# Patient Record
Sex: FEMALE | Race: WHITE | Employment: PART TIME
[De-identification: names, ages, dates, MRNs, and addresses within clinical notes are randomized per-mention and may not be internally consistent; named-entity substitution may affect disease eponyms.]

---

## 2022-05-04 DIAGNOSIS — E78.2 MIXED HYPERLIPIDEMIA: ICD-10-CM

## 2022-05-04 DIAGNOSIS — E11.9 TYPE 2 DIABETES MELLITUS WITHOUT COMPLICATION, WITHOUT LONG-TERM CURRENT USE OF INSULIN (HCC): Primary | ICD-10-CM

## 2022-05-05 PROBLEM — E78.2 MIXED HYPERLIPIDEMIA: Status: ACTIVE | Noted: 2022-05-05

## 2022-05-05 PROBLEM — E11.9 CONTROLLED TYPE 2 DIABETES MELLITUS WITHOUT COMPLICATION, WITHOUT LONG-TERM CURRENT USE OF INSULIN (HCC): Status: ACTIVE | Noted: 2022-05-05

## 2022-07-25 ENCOUNTER — TELEPHONE (OUTPATIENT)
Dept: PRIMARY CARE CLINIC | Facility: CLINIC | Age: 47
End: 2022-07-25

## 2022-08-02 NOTE — TELEPHONE ENCOUNTER
Refill request for Montelukast 10 mg, please send RX to [facility]. Thank you!

## 2022-08-03 RX ORDER — MONTELUKAST SODIUM 10 MG/1
10 TABLET ORAL DAILY
Qty: 90 TABLET | Refills: 0 | Status: SHIPPED | OUTPATIENT
Start: 2022-08-03 | End: 2022-08-09 | Stop reason: SDUPTHER

## 2022-08-09 ENCOUNTER — TELEMEDICINE (OUTPATIENT)
Dept: PRIMARY CARE CLINIC | Facility: CLINIC | Age: 47
End: 2022-08-09
Payer: COMMERCIAL

## 2022-08-09 DIAGNOSIS — E78.2 MIXED HYPERLIPIDEMIA: ICD-10-CM

## 2022-08-09 DIAGNOSIS — J30.9 CHRONIC ALLERGIC RHINITIS: ICD-10-CM

## 2022-08-09 DIAGNOSIS — E11.9 CONTROLLED TYPE 2 DIABETES MELLITUS WITHOUT COMPLICATION, WITHOUT LONG-TERM CURRENT USE OF INSULIN (HCC): Primary | ICD-10-CM

## 2022-08-09 PROCEDURE — 99213 OFFICE O/P EST LOW 20 MIN: CPT | Performed by: FAMILY MEDICINE

## 2022-08-09 PROCEDURE — 3044F HG A1C LEVEL LT 7.0%: CPT | Performed by: FAMILY MEDICINE

## 2022-08-09 RX ORDER — METFORMIN HYDROCHLORIDE 500 MG/1
1000 TABLET, EXTENDED RELEASE ORAL 2 TIMES DAILY
Qty: 360 TABLET | Refills: 1 | Status: SHIPPED | OUTPATIENT
Start: 2022-08-09 | End: 2022-11-07

## 2022-08-09 RX ORDER — EMPAGLIFLOZIN 10 MG/1
10 TABLET, FILM COATED ORAL DAILY
COMMUNITY
Start: 2022-05-04 | End: 2022-08-09 | Stop reason: SDUPTHER

## 2022-08-09 RX ORDER — FENOFIBRATE 160 MG/1
160 TABLET ORAL DAILY
Qty: 90 TABLET | Refills: 1 | Status: SHIPPED | OUTPATIENT
Start: 2022-08-09 | End: 2022-11-07

## 2022-08-09 RX ORDER — FEXOFENADINE HCL 180 MG/1
180 TABLET ORAL DAILY PRN
COMMUNITY

## 2022-08-09 RX ORDER — ATORVASTATIN CALCIUM 10 MG/1
10 TABLET, FILM COATED ORAL DAILY
COMMUNITY
Start: 2022-05-04 | End: 2022-08-09 | Stop reason: SDUPTHER

## 2022-08-09 RX ORDER — ATORVASTATIN CALCIUM 10 MG/1
10 TABLET, FILM COATED ORAL DAILY
Qty: 90 TABLET | Refills: 1 | Status: SHIPPED | OUTPATIENT
Start: 2022-08-09 | End: 2022-11-07

## 2022-08-09 RX ORDER — LOSARTAN POTASSIUM 25 MG/1
12.5 TABLET ORAL DAILY
COMMUNITY
End: 2022-08-09 | Stop reason: SDUPTHER

## 2022-08-09 RX ORDER — EMPAGLIFLOZIN 10 MG/1
10 TABLET, FILM COATED ORAL DAILY
Qty: 90 TABLET | Refills: 1 | Status: SHIPPED | OUTPATIENT
Start: 2022-08-09 | End: 2022-11-07

## 2022-08-09 RX ORDER — METFORMIN HYDROCHLORIDE 500 MG/1
500 TABLET, EXTENDED RELEASE ORAL 2 TIMES DAILY
COMMUNITY
Start: 2022-05-04 | End: 2022-08-09 | Stop reason: SDUPTHER

## 2022-08-09 RX ORDER — MONTELUKAST SODIUM 10 MG/1
10 TABLET ORAL DAILY
Qty: 90 TABLET | Refills: 1 | Status: SHIPPED | OUTPATIENT
Start: 2022-08-09 | End: 2022-11-07

## 2022-08-09 RX ORDER — LOSARTAN POTASSIUM 25 MG/1
12.5 TABLET ORAL DAILY
Qty: 90 TABLET | Refills: 1 | Status: SHIPPED | OUTPATIENT
Start: 2022-08-09 | End: 2022-11-07

## 2022-08-09 RX ORDER — FENOFIBRATE 160 MG/1
160 TABLET ORAL DAILY
COMMUNITY
Start: 2022-05-04 | End: 2022-08-09 | Stop reason: SDUPTHER

## 2022-08-09 ASSESSMENT — ENCOUNTER SYMPTOMS
EYE REDNESS: 0
EYE DISCHARGE: 0
SORE THROAT: 0
NAUSEA: 0
ABDOMINAL PAIN: 0
EYE PAIN: 0
SINUS PAIN: 0
SHORTNESS OF BREATH: 0
RHINORRHEA: 0
SINUS PRESSURE: 0
DIARRHEA: 0
BACK PAIN: 0
BLOOD IN STOOL: 0
CONSTIPATION: 0
EYE ITCHING: 0
CHEST TIGHTNESS: 0
COUGH: 0
VOMITING: 0
WHEEZING: 0

## 2022-08-09 ASSESSMENT — PATIENT HEALTH QUESTIONNAIRE - PHQ9
SUM OF ALL RESPONSES TO PHQ QUESTIONS 1-9: 0
SUM OF ALL RESPONSES TO PHQ9 QUESTIONS 1 & 2: 0
1. LITTLE INTEREST OR PLEASURE IN DOING THINGS: 0
SUM OF ALL RESPONSES TO PHQ QUESTIONS 1-9: 0
2. FEELING DOWN, DEPRESSED OR HOPELESS: 0
SUM OF ALL RESPONSES TO PHQ QUESTIONS 1-9: 0
SUM OF ALL RESPONSES TO PHQ QUESTIONS 1-9: 0

## 2022-08-09 NOTE — ASSESSMENT & PLAN NOTE
Labs ordered, advised to get it done. Short acting metformin causing diarrhea without abdominal pain for the past 4 years, switched to extended release 500 mg 2 tablets twice daily metformin to decrease any side effects and to also avoid any   diarrhea. No history of high blood pressure, takes losartan for renal protection. Recommended to cut down on carbohydrates and increase proteins. Cut down on sugars, sweets, bread, rice, potatoes, cereals, pasta. Eat more of vegetables and greens and salads. Recommended to follow diabetic diet, 1800 dandre ADA diet. Stop or cut down on alcohol. Explained   to the patient in detail the importance of eating about the same time, balance diet, portion sizes. Also explained the symptoms and signs of low   and high sugar and complications of diabetes including but not limited to kidney, eye, nerve damage, and heart attack/strokes. Stable on current regimen, advised to continue it. Pt agreed.

## 2022-08-09 NOTE — PROGRESS NOTES
Hamida Dolan (:  1975) is a Established patient, here for evaluation of the following:    Assessment & Plan   Below is the assessment and plan developed based on review of pertinent history, physical exam, labs, studies, and medications. 1. Controlled type 2 diabetes mellitus without complication, without long-term current use of insulin [facility])  Assessment & Plan:  Labs ordered, advised to get it done. Short acting metformin causing diarrhea without abdominal pain for the past 4 years, switched to extended release 500 mg 2 tablets twice daily metformin to decrease any side effects and to also avoid any   diarrhea. No history of high blood pressure, takes losartan for renal protection. Recommended to cut down on carbohydrates and increase proteins. Cut down on sugars, sweets, bread, rice, potatoes, cereals, pasta. Eat more of vegetables and greens and salads. Recommended to follow diabetic diet, 1800 dandre ADA diet. Stop or cut down on alcohol. Explained   to the patient in detail the importance of eating about the same time, balance diet, portion sizes. Also explained the symptoms and signs of low   and high sugar and complications of diabetes including but not limited to kidney, eye, nerve damage, and heart attack/strokes. Stable on current regimen, advised to continue it. Pt agreed. Orders:  -     Basic Metabolic Panel; Future  -     Hemoglobin A1C; Future  -     JARDIANCE 10 MG tablet; Take 1 tablet by mouth in the morning., Disp-90 tablet, R-1, DAWNormal  -     losartan (COZAAR) 25 MG tablet; Take 0.5 tablets by mouth in the morning., Disp-90 tablet, R-1Normal  -     metFORMIN (GLUCOPHAGE-XR) 500 MG extended release tablet; Take 2 tablets by mouth in the morning and at bedtime, Disp-360 tablet, R-1Normal  -     [facility]  2. Mixed hyperlipidemia  -     atorvastatin (LIPITOR) 10 MG tablet;  Take 1 tablet by mouth in the morning., Disp-90 tablet, R-1Normal  -     fenofibrate (TRIGLIDE) 160 MG tablet; Take 1 tablet by mouth in the morning., Disp-90 tablet, R-1Normal  3. Chronic allergic rhinitis  -     montelukast (SINGULAIR) 10 MG tablet; Take 1 tablet by mouth in the morning., Disp-90 tablet, R-1Normal      Return in about 6 months (around 2/9/2023). Subjective   Diabetes  The patient is a 40 y.o. female who is seen for follow up of diabetes. Patient complains of associated symptoms: none. Patient denies: polyuria, polydipsia, visual disturbances, foot ulcerations, chest pain, dyspnea on exertion, swelling, muscle aches. Onset/Duration of symptoms was several years ago,  Quality of DM control: excellent  Timing: progressive   DM symptoms Severity are mild  Modifying Factors: Treatment to date: continued metformin: effective, continued statin: effective, continued jardiance: effective. Context: Concurrent health issues: no polyuria or polydipsia, no chest pain, dyspnea or TIA's, no numbness, tingling or pain in extremities, no unusual visual symptoms, no hypoglycemia, blurry vision. taking medications as instructed, side effects noted by patient include no medication side effects noted and diarrhea from metformin, no TIA's, no chest pain on exertion, no dyspnea on exertion, no swelling of ankles, no orthostatic dizziness or lightheadedness, no orthopnea or paroxysmal nocturnal dyspnea, no palpitations, no change to vision, no change to feet. oral agents (dual therapy): metformin and jardiance  Last eye exam done Oct 2021. No trouble noted with feet. Home sugars: patient does not check sugars. Hyperlipidemia  The patient is following a low fat diet and is exercising sparodically. She is tolerating current treatment well and  is compliant. Patient reports associated symptoms of none. Denies all other ROS. No other problems or concerns    Review of Systems   Constitutional:  Negative for activity change, appetite change, chills, fatigue, fever and unexpected weight change. HENT:  Negative for congestion, ear discharge, ear pain, postnasal drip, rhinorrhea, sinus pressure, sinus pain, sneezing and sore throat. Eyes:  Negative for pain, discharge, redness, itching and visual disturbance. Respiratory:  Negative for cough, chest tightness, shortness of breath and wheezing. Cardiovascular:  Negative for chest pain, palpitations and leg swelling. Gastrointestinal:  Negative for abdominal pain, blood in stool, constipation, diarrhea, nausea and vomiting. Musculoskeletal:  Negative for arthralgias, back pain, gait problem, myalgias, neck pain and neck stiffness. Skin:  Negative for rash and wound. Neurological:  Negative for dizziness, tremors, syncope, weakness, light-headedness, numbness and headaches. Psychiatric/Behavioral:  Negative for agitation, behavioral problems, confusion, self-injury, sleep disturbance and suicidal ideas. The patient is not nervous/anxious. Neg Depression   All other systems reviewed and are negative.        Objective   Patient-Reported Vitals  Patient-Reported Systolic (Top): 626 mmHg  Patient-Reported Diastolic (Bottom): 88 mmHg  Patient-Reported Pulse: 84  Patient-Reported Weight: 250 lbs     Physical Exam  [INSTRUCTIONS:  \"[x]\" Indicates a positive item  \"[]\" Indicates a negative item  -- DELETE ALL ITEMS NOT EXAMINED]    Constitutional: [x] Appears well-developed and well-nourished [x] No apparent distress      [] Abnormal -     Mental status: [x] Alert and awake  [x] Oriented to person/place/time [x] Able to follow commands    [] Abnormal -     Eyes:   EOM    [x]  Normal    [] Abnormal -   Sclera  [x]  Normal    [] Abnormal -          Discharge [x]  None visible   [] Abnormal -     HENT: [x] Normocephalic, atraumatic  [] Abnormal -   [x] Mouth/Throat: Mucous membranes are moist    External Ears [x] Normal  [] Abnormal -    Neck: [x] No visualized mass [] Abnormal -     Pulmonary/Chest: [x] Respiratory effort normal   [x] No visualized signs of difficulty breathing or respiratory distress        [] Abnormal -      Musculoskeletal:   [x] Normal gait with no signs of ataxia         [x] Normal range of motion of neck        [] Abnormal -     Neurological:        [x] No Facial Asymmetry (Cranial nerve 7 motor function) (limited exam due to video visit)          [x] No gaze palsy        [] Abnormal -          Skin:        [x] No significant exanthematous lesions or discoloration noted on facial skin         [] Abnormal -            Psychiatric:       [x] Normal Affect [] Abnormal -        [x] No Hallucinations    Other pertinent observable physical exam findings:-         On this date 8/9/2022 I have spent 15 minutes reviewing previous notes, test results and face to face (virtual) with the patient discussing the diagnosis and importance of compliance with the treatment plan as well as documenting on the day of the visit. Mikel Cover, was evaluated through a synchronous (real-time) audio-video encounter. The patient (or guardian if applicable) is aware that this is a billable service, which includes applicable co-pays. This Virtual Visit was conducted with patient's (and/or legal guardian's) consent. The visit was conducted pursuant to the emergency declaration under the [address] [facility], [address] [facility] authority and the "[employer] and [employer] General Act. Patient identification was verified, and a caregiver was present when appropriate. The patient was located at Home: [address] [facility]. Provider was located at [facility] ([address] [place] [address]): [phone]  [address] [facility],  [address] [facility]

## 2022-09-16 LAB
CHOLEST SERPL-MCNC: 152 MG/DL
GLUCOSE SERPL-MCNC: 122 MG/DL (ref 65–100)
HDLC SERPL-MCNC: 37 MG/DL (ref 40–60)
LDLC SERPL CALC-MCNC: 35.6 MG/DL
TRIGL SERPL-MCNC: 397 MG/DL (ref 35–150)

## 2022-11-03 DIAGNOSIS — E11.9 CONTROLLED TYPE 2 DIABETES MELLITUS WITHOUT COMPLICATION, WITHOUT LONG-TERM CURRENT USE OF INSULIN (HCC): ICD-10-CM

## 2022-11-03 DIAGNOSIS — E78.2 MIXED HYPERLIPIDEMIA: ICD-10-CM

## 2022-11-08 RX ORDER — METFORMIN HYDROCHLORIDE 500 MG/1
1000 TABLET, EXTENDED RELEASE ORAL 2 TIMES DAILY
Qty: 360 TABLET | Refills: 0 | OUTPATIENT
Start: 2022-11-08 | End: 2023-02-06

## 2022-11-08 RX ORDER — ATORVASTATIN CALCIUM 10 MG/1
TABLET, FILM COATED ORAL
Qty: 90 TABLET | Refills: 0 | OUTPATIENT
Start: 2022-11-08

## 2022-11-08 RX ORDER — FENOFIBRATE 160 MG/1
TABLET ORAL
Qty: 90 TABLET | Refills: 0 | OUTPATIENT
Start: 2022-11-08

## 2022-11-18 DIAGNOSIS — E11.9 CONTROLLED TYPE 2 DIABETES MELLITUS WITHOUT COMPLICATION, WITHOUT LONG-TERM CURRENT USE OF INSULIN (HCC): ICD-10-CM

## 2022-11-22 RX ORDER — EMPAGLIFLOZIN 10 MG/1
TABLET, FILM COATED ORAL
Qty: 90 TABLET | Refills: 0 | OUTPATIENT
Start: 2022-11-22

## 2023-01-06 DIAGNOSIS — E11.9 CONTROLLED TYPE 2 DIABETES MELLITUS WITHOUT COMPLICATION, WITHOUT LONG-TERM CURRENT USE OF INSULIN (HCC): ICD-10-CM

## 2023-01-06 DIAGNOSIS — E78.2 MIXED HYPERLIPIDEMIA: ICD-10-CM

## 2023-01-06 DIAGNOSIS — E11.9 TYPE 2 DIABETES MELLITUS WITHOUT COMPLICATION, WITHOUT LONG-TERM CURRENT USE OF INSULIN (HCC): ICD-10-CM

## 2023-01-06 LAB
ALBUMIN SERPL-MCNC: 4.2 G/DL (ref 3.5–5)
ALBUMIN/GLOB SERPL: 1.3 (ref 0.4–1.6)
ALP SERPL-CCNC: 57 U/L (ref 50–136)
ALT SERPL-CCNC: 44 U/L (ref 12–65)
ANION GAP SERPL CALC-SCNC: 10 MMOL/L (ref 2–11)
AST SERPL-CCNC: 37 U/L (ref 15–37)
BASOPHILS # BLD: 0.1 K/UL (ref 0–0.2)
BASOPHILS NFR BLD: 1 % (ref 0–2)
BILIRUB DIRECT SERPL-MCNC: <0.1 MG/DL
BILIRUB SERPL-MCNC: 0.4 MG/DL (ref 0.2–1.1)
BUN SERPL-MCNC: 19 MG/DL (ref 6–23)
CALCIUM SERPL-MCNC: 9.9 MG/DL (ref 8.3–10.4)
CHLORIDE SERPL-SCNC: 104 MMOL/L (ref 101–110)
CHOLEST SERPL-MCNC: 134 MG/DL
CO2 SERPL-SCNC: 25 MMOL/L (ref 21–32)
CREAT SERPL-MCNC: 0.8 MG/DL (ref 0.6–1)
DIFFERENTIAL METHOD BLD: ABNORMAL
EOSINOPHIL # BLD: 0.2 K/UL (ref 0–0.8)
EOSINOPHIL NFR BLD: 2 % (ref 0.5–7.8)
ERYTHROCYTE [DISTWIDTH] IN BLOOD BY AUTOMATED COUNT: 13.5 % (ref 11.9–14.6)
EST. AVERAGE GLUCOSE BLD GHB EST-MCNC: 123 MG/DL
GLOBULIN SER CALC-MCNC: 3.2 G/DL (ref 2.8–4.5)
GLUCOSE SERPL-MCNC: 126 MG/DL (ref 65–100)
HBA1C MFR BLD: 5.9 % (ref 4.8–5.6)
HCT VFR BLD AUTO: 53.9 % (ref 35.8–46.3)
HDLC SERPL-MCNC: 36 MG/DL (ref 40–60)
HDLC SERPL: 3.7
HGB BLD-MCNC: 17.4 G/DL (ref 11.7–15.4)
IMM GRANULOCYTES # BLD AUTO: 0.1 K/UL (ref 0–0.5)
IMM GRANULOCYTES NFR BLD AUTO: 1 % (ref 0–5)
LDLC SERPL CALC-MCNC: ABNORMAL MG/DL
LYMPHOCYTES # BLD: 2.4 K/UL (ref 0.5–4.6)
LYMPHOCYTES NFR BLD: 34 % (ref 13–44)
MCH RBC QN AUTO: 29.3 PG (ref 26.1–32.9)
MCHC RBC AUTO-ENTMCNC: 32.3 G/DL (ref 31.4–35)
MCV RBC AUTO: 90.7 FL (ref 82–102)
MONOCYTES # BLD: 0.5 K/UL (ref 0.1–1.3)
MONOCYTES NFR BLD: 7 % (ref 4–12)
NEUTS SEG # BLD: 3.9 K/UL (ref 1.7–8.2)
NEUTS SEG NFR BLD: 55 % (ref 43–78)
NRBC # BLD: 0 K/UL (ref 0–0.2)
PLATELET # BLD AUTO: 281 K/UL (ref 150–450)
PMV BLD AUTO: 10.5 FL (ref 9.4–12.3)
POTASSIUM SERPL-SCNC: 4.9 MMOL/L (ref 3.5–5.1)
PROT SERPL-MCNC: 7.4 G/DL (ref 6.3–8.2)
RBC # BLD AUTO: 5.94 M/UL (ref 4.05–5.2)
SODIUM SERPL-SCNC: 139 MMOL/L (ref 133–143)
TRIGL SERPL-MCNC: 489 MG/DL (ref 35–150)
VLDLC SERPL CALC-MCNC: 97.8 MG/DL (ref 6–23)
WBC # BLD AUTO: 7.1 K/UL (ref 4.3–11.1)

## 2023-01-07 LAB — LDLC SERPL DIRECT ASSAY-MCNC: 63 MG/DL

## 2023-01-10 ENCOUNTER — TELEMEDICINE (OUTPATIENT)
Dept: PRIMARY CARE CLINIC | Facility: CLINIC | Age: 48
End: 2023-01-10
Payer: COMMERCIAL

## 2023-01-10 DIAGNOSIS — G43.709 CHRONIC MIGRAINE W/O AURA W/O STATUS MIGRAINOSUS, NOT INTRACTABLE: ICD-10-CM

## 2023-01-10 DIAGNOSIS — E78.2 MIXED HYPERLIPIDEMIA: ICD-10-CM

## 2023-01-10 DIAGNOSIS — J30.9 CHRONIC ALLERGIC RHINITIS: ICD-10-CM

## 2023-01-10 DIAGNOSIS — E11.9 CONTROLLED TYPE 2 DIABETES MELLITUS WITHOUT COMPLICATION, WITHOUT LONG-TERM CURRENT USE OF INSULIN (HCC): Primary | ICD-10-CM

## 2023-01-10 PROCEDURE — 99214 OFFICE O/P EST MOD 30 MIN: CPT | Performed by: FAMILY MEDICINE

## 2023-01-10 PROCEDURE — 3044F HG A1C LEVEL LT 7.0%: CPT | Performed by: FAMILY MEDICINE

## 2023-01-10 RX ORDER — SUMATRIPTAN 50 MG/1
50 TABLET, FILM COATED ORAL
Qty: 9 TABLET | Refills: 3 | Status: SHIPPED | OUTPATIENT
Start: 2023-01-10 | End: 2023-01-10

## 2023-01-10 RX ORDER — ATORVASTATIN CALCIUM 10 MG/1
10 TABLET, FILM COATED ORAL DAILY
Qty: 90 TABLET | Refills: 1 | Status: SHIPPED | OUTPATIENT
Start: 2023-01-10 | End: 2023-04-10

## 2023-01-10 RX ORDER — LOSARTAN POTASSIUM 25 MG/1
12.5 TABLET ORAL DAILY
Qty: 90 TABLET | Refills: 1 | Status: SHIPPED | OUTPATIENT
Start: 2023-01-10 | End: 2023-04-10

## 2023-01-10 RX ORDER — METFORMIN HYDROCHLORIDE 500 MG/1
1000 TABLET, EXTENDED RELEASE ORAL 2 TIMES DAILY
Qty: 360 TABLET | Refills: 1 | Status: SHIPPED | OUTPATIENT
Start: 2023-01-10 | End: 2023-04-10

## 2023-01-10 RX ORDER — FENOFIBRATE 160 MG/1
160 TABLET ORAL DAILY
Qty: 90 TABLET | Refills: 1 | Status: SHIPPED | OUTPATIENT
Start: 2023-01-10 | End: 2023-04-10

## 2023-01-10 RX ORDER — EMPAGLIFLOZIN 10 MG/1
10 TABLET, FILM COATED ORAL DAILY
Qty: 90 TABLET | Refills: 1 | Status: SHIPPED | OUTPATIENT
Start: 2023-01-10 | End: 2023-04-10

## 2023-01-10 RX ORDER — ONDANSETRON 4 MG/1
4 TABLET, ORALLY DISINTEGRATING ORAL EVERY 12 HOURS PRN
Qty: 21 TABLET | Refills: 1 | Status: SHIPPED | OUTPATIENT
Start: 2023-01-10

## 2023-01-10 RX ORDER — MONTELUKAST SODIUM 10 MG/1
10 TABLET ORAL DAILY
Qty: 90 TABLET | Refills: 1 | Status: SHIPPED | OUTPATIENT
Start: 2023-01-10 | End: 2023-04-10

## 2023-01-10 ASSESSMENT — PATIENT HEALTH QUESTIONNAIRE - PHQ9
SUM OF ALL RESPONSES TO PHQ QUESTIONS 1-9: 0
2. FEELING DOWN, DEPRESSED OR HOPELESS: 0
SUM OF ALL RESPONSES TO PHQ QUESTIONS 1-9: 0
1. LITTLE INTEREST OR PLEASURE IN DOING THINGS: 0
SUM OF ALL RESPONSES TO PHQ9 QUESTIONS 1 & 2: 0

## 2023-01-10 NOTE — PROGRESS NOTES
Hamida Dolan (:  1975) is a Established patient, here for evaluation of the following:    Assessment & Plan   Below is the assessment and plan developed based on review of pertinent history, physical exam, labs, studies, and medications. 1. Controlled type 2 diabetes mellitus without complication, without long-term current use of insulin ([facility] [address]  Assessment & Plan:  Labs reviewed. losartan for renal protection. Advised to take daily baby asa. Recommended to cut down on carbohydrates and increase proteins. Cut down on sugars, sweets, bread, rice, potatoes, cereals, pasta. Eat more of vegetables and greens and salads. Recommended to follow diabetic diet, 1800 dandre ADA diet. Stop or cut down on alcohol. Explained to the patient in detail the importance of eating about the same time, balance diet, portion sizes. Also explained the symptoms and signs of low and high sugar and complications of diabetes including but not limited to kidney, eye, nerve damage, and heart attack/strokes. Stable on current regimen, advised to continue it. Pt agreed. Orders:  -     metFORMIN (GLUCOPHAGE-XR) 500 MG extended release tablet; Take 2 tablets by mouth in the morning and at bedtime, Disp-360 tablet, R-1Normal  -     losartan (COZAAR) 25 MG tablet; Take 0.5 tablets by mouth daily, Disp-90 tablet, R-1Normal  -     JARDIANCE 10 MG tablet; Take 1 tablet by mouth daily, Disp-90 tablet, R-1, DAWNormal  2. Mixed hyperlipidemia  Assessment & Plan:  Explain the need to avoid fatty foods, fried foods, red meats, and sweets. Increase intake of vegetables, lean meats, and reduce intake of carbs. Advise to do minimum of 20-30 minutes of daily exercise. Patient not doing any diet control or exercise, educated on the importance. On statin + fenofibrate. Orders:  -     fenofibrate (TRIGLIDE) 160 MG tablet; Take 1 tablet by mouth daily, Disp-90 tablet, R-1Normal  -     atorvastatin (LIPITOR) 10 MG tablet;  Take 1 tablet by mouth daily, Disp-90 tablet, R-1Normal  3. Chronic allergic rhinitis  -     montelukast (SINGULAIR) 10 MG tablet; Take 1 tablet by mouth daily, Disp-90 tablet, R-1Normal  4. Chronic migraine w/o aura w/o status migrainosus, not intractable  -     SUMAtriptan (IMITREX) 50 MG tablet; Take 1 tablet by mouth once as needed for Migraine (repeat after 2 hours as needed), Disp-9 tablet, R-3Normal  -     ondansetron (ZOFRAN-ODT) 4 MG disintegrating tablet; Place 1 tablet under the tongue every 12 hours as needed for Nausea or Vomiting, Disp-21 tablet, R-1Normal    Use med as directed, SE informed. Advised on good, consistent sleep regimen, stress management, regular exercise, adequated water intake (half of body wt in oz). Advised on whole foods, eat regular meals, avoid processed foods, artificial foods/sweeteners. Avoid substances such as caffeine, alcohol, cigarettes. Advised to log (do diary) of headache triggers (foods, events, scents, sensitivity to things/events). Advised to monitor and report to ED/ER for any thunder clap headache or worsening headaches blanca changes in cognition, vision. Stable on chronic conditions, refills given today. Labs reviewed today. All questions and concerns answered. Patient voiced understanding and agrees with POC. Return in about 6 months (around 7/10/2023) for Physical.       Subjective   Diabetes  The patient is a 40 y.o. female who is seen for follow up of diabetes and hyperlipidemia. Patient complains of associated symptoms: none. Patient denies: polyuria, polydipsia, visual disturbances, foot ulcerations, chest pain, dyspnea on exertion, swelling, muscle aches. Onset/Duration of symptoms was several years ago,  Quality of DM control: excellent  Timing: progressive   DM symptoms Severity are mild  Modifying Factors: Treatment to date: continued metformin: effective, continued statin: effective, continued jardiance: effective.   Context: Concurrent health issues: no polyuria or polydipsia, no chest pain, dyspnea or TIA's, no numbness, tingling or pain in extremities, no unusual visual symptoms, no hypoglycemia, blurry vision. taking medications as instructed, side effects noted by patient include no medication side effects noted and diarrhea from metformin, no TIA's, no chest pain on exertion, no dyspnea on exertion, no swelling of ankles, no orthostatic dizziness or lightheadedness, no orthopnea or paroxysmal nocturnal dyspnea, no palpitations, no change to vision, no change to feet. oral agents (dual therapy): metformin and jardiance  Last eye exam done Oct 2021. No trouble noted with feet. Home sugars: patient does not check sugars. Hyperlipidemia  No diet control or exercise regimen. She is tolerating current treatment well and  is compliant. Patient reports associated symptoms of none. She states that she used to get migraine in the past, and it has started to reoccur. She previously had it prior to birth of her child and now after many years, she has started to have it again. It has occurred 4-6 times over the one year, and is requesting to take as needed migraine medication. She states that she has nausea and vomiting with the migraine headache. Denies all other ROS. No other problems or concerns    Review of Systems   Constitutional:  Negative for appetite change, chills, fatigue, fever and unexpected weight change. HENT:  Negative for congestion, ear discharge, ear pain, postnasal drip, rhinorrhea, sinus pressure, sinus pain, sneezing and sore throat. Eyes:  Negative for pain, redness and visual disturbance. Respiratory:  Negative for cough, chest tightness, shortness of breath and wheezing. Cardiovascular:  Negative for chest pain, palpitations and leg swelling. Gastrointestinal:  Negative for abdominal pain, blood in stool, constipation, diarrhea, nausea and vomiting.    Musculoskeletal:  Negative for arthralgias, back pain, gait problem, neck pain and neck stiffness. Skin:  Negative for rash and wound. Neurological:  Negative for dizziness, tremors, syncope, weakness, numbness and headaches. Psychiatric/Behavioral:  Negative for behavioral problems, confusion, self-injury, sleep disturbance and suicidal ideas. The patient is not nervous/anxious. Denies Depression   All other systems reviewed and are negative.        Objective   Patient-Reported Vitals  No data recorded     Physical Exam  [INSTRUCTIONS:  \"[x]\" Indicates a positive item  \"[]\" Indicates a negative item  -- DELETE ALL ITEMS NOT EXAMINED]    Constitutional: [x] Appears well-developed and well-nourished [x] No apparent distress      [x] Abnormal - obese    Mental status: [x] Alert and awake  [x] Oriented to person/place/time [x] Able to follow commands    [] Abnormal -     Eyes:   EOM    [x]  Normal    [] Abnormal -   Sclera  [x]  Normal    [] Abnormal -          Discharge [x]  None visible   [] Abnormal -     HENT: [x] Normocephalic, atraumatic  [] Abnormal -   [x] Mouth/Throat: Mucous membranes are moist    External Ears [x] Normal  [] Abnormal -    Neck: [x] No visualized mass [] Abnormal -     Pulmonary/Chest: [x] Respiratory effort normal   [x] No visualized signs of difficulty breathing or respiratory distress        [] Abnormal -      Musculoskeletal:   [x] Normal gait with no signs of ataxia         [x] Normal range of motion of neck        [] Abnormal -     Neurological:        [x] No Facial Asymmetry (Cranial nerve 7 motor function) (limited exam due to video visit)          [x] No gaze palsy        [] Abnormal -          Skin:        [x] No significant exanthematous lesions or discoloration noted on facial skin         [] Abnormal -            Psychiatric:       [x] Normal Affect [] Abnormal -        [x] No Hallucinations    Other pertinent observable physical exam findings:-         On this date 1/10/2023 I have spent 20 minutes reviewing previous notes, test results and face to face (virtual) with the patient discussing the diagnosis and importance of compliance with the treatment plan as well as documenting on the day of the visit. Allyson Tapia, was evaluated through a synchronous (real-time) audio-video encounter. The patient (or guardian if applicable) is aware that this is a billable service, which includes applicable co-pays. This Virtual Visit was conducted with patient's (and/or legal guardian's) consent. The visit was conducted pursuant to the emergency declaration under the [address] [place] [address], [address] [place] [address] authority and the [employer] General Act. Patient identification was verified, and a caregiver was present when appropriate. The patient was located at Home: [address] [facility]. Provider was located at [facility] ([address] [place] [address]): [phone]  [address] [facility],  [address] [facility]

## 2023-01-11 ASSESSMENT — ENCOUNTER SYMPTOMS
SINUS PRESSURE: 0
SHORTNESS OF BREATH: 0
NAUSEA: 0
SORE THROAT: 0
COUGH: 0
BLOOD IN STOOL: 0
CHEST TIGHTNESS: 0
EYE REDNESS: 0
DIARRHEA: 0
CONSTIPATION: 0
EYE PAIN: 0
WHEEZING: 0
SINUS PAIN: 0
BACK PAIN: 0
RHINORRHEA: 0
VOMITING: 0
ABDOMINAL PAIN: 0

## 2023-01-11 NOTE — ASSESSMENT & PLAN NOTE
Explain the need to avoid fatty foods, fried foods, red meats, and sweets. Increase intake of vegetables, lean meats, and reduce intake of carbs. Advise to do minimum of 20-30 minutes of daily exercise. Patient not doing any diet control or exercise, educated on the importance. On statin + fenofibrate. No pertinent family history in first degree relatives

## 2023-01-11 NOTE — ASSESSMENT & PLAN NOTE
Labs reviewed. losartan for renal protection. Advised to take daily baby asa. Recommended to cut down on carbohydrates and increase proteins. Cut down on sugars, sweets, bread, rice, potatoes, cereals, pasta. Eat more of vegetables and greens and salads. Recommended to follow diabetic diet, 1800 dandre ADA diet. Stop or cut down on alcohol. Explained to the patient in detail the importance of eating about the same time, balance diet, portion sizes. Also explained the symptoms and signs of low and high sugar and complications of diabetes including but not limited to kidney, eye, nerve damage, and heart attack/strokes. Stable on current regimen, advised to continue it. Pt agreed.

## 2023-01-31 ENCOUNTER — OFFICE VISIT (OUTPATIENT)
Dept: PRIMARY CARE CLINIC | Facility: CLINIC | Age: 48
End: 2023-01-31
Payer: COMMERCIAL

## 2023-01-31 VITALS
SYSTOLIC BLOOD PRESSURE: 137 MMHG | WEIGHT: 255 LBS | OXYGEN SATURATION: 97 % | DIASTOLIC BLOOD PRESSURE: 88 MMHG | BODY MASS INDEX: 40.98 KG/M2 | HEIGHT: 66 IN | HEART RATE: 93 BPM

## 2023-01-31 DIAGNOSIS — E78.2 MIXED HYPERLIPIDEMIA: ICD-10-CM

## 2023-01-31 DIAGNOSIS — M54.50 ACUTE RIGHT-SIDED LOW BACK PAIN WITHOUT SCIATICA: Primary | ICD-10-CM

## 2023-01-31 DIAGNOSIS — M62.830 BACK MUSCLE SPASM: ICD-10-CM

## 2023-01-31 DIAGNOSIS — Z71.3 DIETARY COUNSELING: ICD-10-CM

## 2023-01-31 DIAGNOSIS — E11.9 CONTROLLED TYPE 2 DIABETES MELLITUS WITHOUT COMPLICATION, WITHOUT LONG-TERM CURRENT USE OF INSULIN (HCC): ICD-10-CM

## 2023-01-31 PROCEDURE — 3044F HG A1C LEVEL LT 7.0%: CPT | Performed by: FAMILY MEDICINE

## 2023-01-31 PROCEDURE — 99214 OFFICE O/P EST MOD 30 MIN: CPT | Performed by: FAMILY MEDICINE

## 2023-01-31 RX ORDER — IBUPROFEN 800 MG/1
800 TABLET ORAL EVERY 8 HOURS PRN
Qty: 30 TABLET | Refills: 0 | Status: SHIPPED | OUTPATIENT
Start: 2023-01-31 | End: 2023-02-10

## 2023-01-31 RX ORDER — ATORVASTATIN CALCIUM 20 MG/1
20 TABLET, FILM COATED ORAL DAILY
Qty: 90 TABLET | Refills: 0 | Status: SHIPPED | OUTPATIENT
Start: 2023-01-31 | End: 2023-05-01

## 2023-01-31 RX ORDER — METHYLPREDNISOLONE 4 MG/1
TABLET ORAL
Qty: 1 KIT | Refills: 0 | Status: SHIPPED | OUTPATIENT
Start: 2023-01-31 | End: 2023-02-06

## 2023-01-31 RX ORDER — CYCLOBENZAPRINE HCL 10 MG
10 TABLET ORAL 2 TIMES DAILY PRN
Qty: 20 TABLET | Refills: 0 | Status: SHIPPED | OUTPATIENT
Start: 2023-01-31 | End: 2023-02-10 | Stop reason: SDUPTHER

## 2023-01-31 SDOH — ECONOMIC STABILITY: FOOD INSECURITY: WITHIN THE PAST 12 MONTHS, THE FOOD YOU BOUGHT JUST DIDN'T LAST AND YOU DIDN'T HAVE MONEY TO GET MORE.: NEVER TRUE

## 2023-01-31 SDOH — ECONOMIC STABILITY: INCOME INSECURITY: IN THE LAST 12 MONTHS, WAS THERE A TIME WHEN YOU WERE NOT ABLE TO PAY THE MORTGAGE OR RENT ON TIME?: NO

## 2023-01-31 SDOH — ECONOMIC STABILITY: TRANSPORTATION INSECURITY
IN THE PAST 12 MONTHS, HAS THE LACK OF TRANSPORTATION KEPT YOU FROM MEDICAL APPOINTMENTS OR FROM GETTING MEDICATIONS?: NO

## 2023-01-31 SDOH — HEALTH STABILITY: PHYSICAL HEALTH: ON AVERAGE, HOW MANY DAYS PER WEEK DO YOU ENGAGE IN MODERATE TO STRENUOUS EXERCISE (LIKE A BRISK WALK)?: 4 DAYS

## 2023-01-31 SDOH — ECONOMIC STABILITY: TRANSPORTATION INSECURITY
IN THE PAST 12 MONTHS, HAS LACK OF TRANSPORTATION KEPT YOU FROM MEETINGS, WORK, OR FROM GETTING THINGS NEEDED FOR DAILY LIVING?: NO

## 2023-01-31 SDOH — ECONOMIC STABILITY: HOUSING INSECURITY
IN THE LAST 12 MONTHS, WAS THERE A TIME WHEN YOU DID NOT HAVE A STEADY PLACE TO SLEEP OR SLEPT IN A SHELTER (INCLUDING NOW)?: NO

## 2023-01-31 SDOH — ECONOMIC STABILITY: HOUSING INSECURITY: IN THE LAST 12 MONTHS, HOW MANY PLACES HAVE YOU LIVED?: 1

## 2023-01-31 SDOH — ECONOMIC STABILITY: FOOD INSECURITY: WITHIN THE PAST 12 MONTHS, YOU WORRIED THAT YOUR FOOD WOULD RUN OUT BEFORE YOU GOT MONEY TO BUY MORE.: NEVER TRUE

## 2023-01-31 SDOH — HEALTH STABILITY: PHYSICAL HEALTH: ON AVERAGE, HOW MANY MINUTES DO YOU ENGAGE IN EXERCISE AT THIS LEVEL?: 20 MIN

## 2023-01-31 ASSESSMENT — SOCIAL DETERMINANTS OF HEALTH (SDOH)
WITHIN THE LAST YEAR, HAVE TO BEEN RAPED OR FORCED TO HAVE ANY KIND OF SEXUAL ACTIVITY BY YOUR PARTNER OR EX-PARTNER?: NO
HOW HARD IS IT FOR YOU TO PAY FOR THE VERY BASICS LIKE FOOD, HOUSING, MEDICAL CARE, AND HEATING?: NOT HARD AT ALL
WITHIN THE LAST YEAR, HAVE YOU BEEN AFRAID OF YOUR PARTNER OR EX-PARTNER?: NO
HOW OFTEN DO YOU ATTENT MEETINGS OF THE CLUB OR ORGANIZATION YOU BELONG TO?: NEVER
IN A TYPICAL WEEK, HOW MANY TIMES DO YOU TALK ON THE PHONE WITH FAMILY, FRIENDS, OR NEIGHBORS?: ONCE A WEEK
HOW OFTEN DO YOU ATTEND CHURCH OR RELIGIOUS SERVICES?: NEVER
HOW OFTEN DO YOU GET TOGETHER WITH FRIENDS OR RELATIVES?: NEVER
WITHIN THE LAST YEAR, HAVE YOU BEEN KICKED, HIT, SLAPPED, OR OTHERWISE PHYSICALLY HURT BY YOUR PARTNER OR EX-PARTNER?: NO
DO YOU BELONG TO ANY CLUBS OR ORGANIZATIONS SUCH AS CHURCH GROUPS UNIONS, FRATERNAL OR ATHLETIC GROUPS, OR SCHOOL GROUPS?: NO
WITHIN THE LAST YEAR, HAVE YOU BEEN HUMILIATED OR EMOTIONALLY ABUSED IN OTHER WAYS BY YOUR PARTNER OR EX-PARTNER?: NO

## 2023-01-31 ASSESSMENT — PATIENT HEALTH QUESTIONNAIRE - PHQ9
SUM OF ALL RESPONSES TO PHQ QUESTIONS 1-9: 0
SUM OF ALL RESPONSES TO PHQ QUESTIONS 1-9: 0
1. LITTLE INTEREST OR PLEASURE IN DOING THINGS: 0
SUM OF ALL RESPONSES TO PHQ QUESTIONS 1-9: 0
SUM OF ALL RESPONSES TO PHQ QUESTIONS 1-9: 0
SUM OF ALL RESPONSES TO PHQ9 QUESTIONS 1 & 2: 0
2. FEELING DOWN, DEPRESSED OR HOPELESS: 0

## 2023-01-31 ASSESSMENT — LIFESTYLE VARIABLES
HOW OFTEN DO YOU HAVE A DRINK CONTAINING ALCOHOL: 4 OR MORE TIMES A WEEK
HOW MANY STANDARD DRINKS CONTAINING ALCOHOL DO YOU HAVE ON A TYPICAL DAY: 1 OR 2

## 2023-01-31 NOTE — PROGRESS NOTES
[facility]  [address], [address]  Phone [phone]  Fax [phone]      Patient: Opal Bell  YOB: 1975  Patient Age 56 y.o. Patient sex: female  Medical Record:  [id number]  Author:  Radha Love DO    Family Practice Progress Note     Chief Complaint   Patient presents with    Back Pain     Mid back pain x 1 wk- no injury        History of Present Illness: Opal Bell is a 56 y.o. female with multiple chronic medical conditions, seen today for mid to low back pain. Back Pain  Andshelley Dolan s a 56 y.o. female who is seen today for evaluation of mid to low back pain on right side, which began 1 week ago and include pain in mid to low back (sharp and shooting in character; 4/10 in severity) and stiffness in mid to low back area. Additional symptoms include: none. Exacerbating factors identifiable by patient are bending forwards and bending sideways. She has tried OTC medication (tylenol) without relief. She does have a history of back pain. She states that it just started suddenly and it has been bothering her a lot and hence came in to get something to help with it. Initial inciting event: None, no trauma/injury. Previous workup: Rest  Ice  OTC analgesics as needed. .   Age >50? No  Previous history of cancer? No  Bowel incontinence? No  Bladder incontinence or urinary retention? No  Saddle anesthesia? No  No other concerns or complaints. Denies all other ROS.       Allergies:No Known Allergies    Current Medications:   Medications marked \"taking\" at this time:  Current Outpatient Medications   Medication Sig Dispense Refill    atorvastatin (LIPITOR) 20 MG tablet Take 1 tablet by mouth daily 90 tablet 0    ibuprofen (ADVIL;MOTRIN) 800 MG tablet Take 1 tablet by mouth every 8 hours as needed for Pain 30 tablet 0    cyclobenzaprine (FLEXERIL) 10 MG tablet Take 1 tablet by mouth 2 times daily as needed for Muscle spasms 20 tablet 0    Multiple Vitamin (MULTIVITAMIN ADULT PO) Take by mouth      montelukast (SINGULAIR) 10 MG tablet Take 1 tablet by mouth daily 90 tablet 1    metFORMIN (GLUCOPHAGE-XR) 500 MG extended release tablet Take 2 tablets by mouth in the morning and at bedtime 360 tablet 1    losartan (COZAAR) 25 MG tablet Take 0.5 tablets by mouth daily 90 tablet 1    JARDIANCE 10 MG tablet Take 1 tablet by mouth daily 90 tablet 1    fenofibrate (TRIGLIDE) 160 MG tablet Take 1 tablet by mouth daily 90 tablet 1    fexofenadine (ALLEGRA) 180 MG tablet Take 180 mg by mouth daily as needed      SUMAtriptan (IMITREX) 50 MG tablet Take 1 tablet by mouth once as needed for Migraine (repeat after 2 hours as needed) (Patient not taking: Reported on 2023) 9 tablet 3    ondansetron (ZOFRAN-ODT) 4 MG disintegrating tablet Place 1 tablet under the tongue every 12 hours as needed for Nausea or Vomiting (Patient not taking: Reported on 2023) 21 tablet 1     No current facility-administered medications for this visit.          Current Problem List:   Patient Active Problem List   Diagnosis    Controlled type 2 diabetes mellitus without complication, without long-term current use of insulin ([facility] [address]    Mixed hyperlipidemia        Past Medical History:   Past Medical History:   Diagnosis Date    Abnormal Papanicolaou smear of cervix     Diabetes ([facility] [address]     Hypercholesterolemia        Social History:   Social History     Tobacco Use    Smoking status: Never    Smokeless tobacco: Never   Substance Use Topics    Alcohol use: Yes       Family History:   Family History   Problem Relation Age of Onset    Heart Disease Father     Diabetes Father     Diabetes Mother     Breast Cancer Neg Hx     Colon Cancer Neg Hx     Ovarian Cancer Neg Hx     Uterine Cancer Neg Hx        Surgical History:  Past Surgical History:   Procedure Laterality Date     SECTION      LEEP      age 26    SEPTOPLASTY         Past medical history, Past surgical history, Family history, Social history, Allergies and Medications were all reviewed with the patient today and updated as necessary. ROS:  Review of Systems   Constitutional:  Negative for appetite change, chills, fatigue, fever and unexpected weight change. HENT:  Negative for congestion, ear discharge, ear pain, postnasal drip, rhinorrhea, sinus pressure, sinus pain, sneezing and sore throat. Eyes:  Negative for pain, redness and visual disturbance. Respiratory:  Negative for cough, chest tightness, shortness of breath and wheezing. Cardiovascular:  Negative for chest pain, palpitations and leg swelling. Gastrointestinal:  Negative for abdominal pain, blood in stool, constipation, diarrhea, nausea and vomiting. Musculoskeletal:  Positive for back pain. Negative for arthralgias, gait problem, neck pain and neck stiffness. Skin:  Negative for rash and wound. Neurological:  Negative for dizziness, tremors, syncope, weakness, numbness and headaches. Psychiatric/Behavioral:  Negative for behavioral problems, confusion, self-injury, sleep disturbance and suicidal ideas. The patient is not nervous/anxious. Denies Depression   All other systems reviewed and are negative. /88 (Site: Left Upper Arm, Position: Sitting, Cuff Size: Medium Adult)   Pulse 93   Ht 5' 6\" (1.676 m)   Wt 255 lb (115.7 kg)   LMP 01/21/2023 (Approximate)   SpO2 97%   BMI 41.16 kg/m²   Body mass index is 41.16 kg/m². Physical Exam:  Physical Exam  Vitals and nursing note reviewed. Constitutional:       General: She is not in acute distress. Appearance: Normal appearance. She is obese. She is not ill-appearing or toxic-appearing. HENT:      Head: Normocephalic and atraumatic. Right Ear: External ear normal.      Left Ear: External ear normal.      Nose: Nose normal. No congestion or rhinorrhea. Mouth/Throat:      Mouth: Mucous membranes are moist.      Pharynx: Oropharynx is clear.  No oropharyngeal exudate or posterior oropharyngeal erythema. Eyes:      General: No scleral icterus. Conjunctiva/sclera: Conjunctivae normal.      Pupils: Pupils are equal, round, and reactive to light. Cardiovascular:      Rate and Rhythm: Normal rate and regular rhythm. Pulses: Normal pulses. Heart sounds: Normal heart sounds. No murmur heard. No friction rub. No gallop. Pulmonary:      Effort: Pulmonary effort is normal. No respiratory distress. Breath sounds: Normal breath sounds. No wheezing, rhonchi or rales. Abdominal:      General: Bowel sounds are normal. There is no distension. Palpations: Abdomen is soft. Tenderness: There is no abdominal tenderness. Musculoskeletal:         General: No deformity. Cervical back: Normal range of motion and neck supple. Thoracic back: No swelling, edema, deformity, signs of trauma, spasms or tenderness. Normal range of motion. Lumbar back: Spasms present. No swelling, edema, deformity, signs of trauma or tenderness. Decreased range of motion. Negative right straight leg raise test and negative left straight leg raise test.   Lymphadenopathy:      Cervical: No cervical adenopathy. Skin:     General: Skin is warm. Findings: No bruising, erythema or rash. Neurological:      General: No focal deficit present. Mental Status: She is alert and oriented to person, place, and time. Mental status is at baseline. Cranial Nerves: No cranial nerve deficit. Sensory: No sensory deficit. Motor: No weakness. Gait: Gait normal.   Psychiatric:         Mood and Affect: Mood normal.         Behavior: Behavior normal.         Thought Content: Thought content normal.         Judgment: Judgment normal.        No results found for this visit on 01/31/23. Medical problems and Test results were reviewed with the patient today.     Assessment/Plan:  Diagnoses and all orders for this visit:  Acute right-sided low back pain without sciatica  -     ibuprofen (ADVIL;MOTRIN) 800 MG tablet; Take 1 tablet by mouth every 8 hours as needed for Pain  -     methylPREDNISolone (MEDROL DOSEPACK) 4 MG tablet; Take by mouth, as directed on the pack  -     cyclobenzaprine (FLEXERIL) 10 MG tablet; Take 1 tablet by mouth 2 times daily as needed for Muscle spasms  Back muscle spasm  -     ibuprofen (ADVIL;MOTRIN) 800 MG tablet; Take 1 tablet by mouth every 8 hours as needed for Pain  -     methylPREDNISolone (MEDROL DOSEPACK) 4 MG tablet; Take by mouth, as directed on the pack  -     cyclobenzaprine (FLEXERIL) 10 MG tablet; Take 1 tablet by mouth 2 times daily as needed for Muscle spasms  Mixed hyperlipidemia  Comments:  fenofibrate + statin (increased dose) + diet and exercise discussed extensively  Orders:  -     atorvastatin (LIPITOR) 20 MG tablet; Take 1 tablet by mouth daily  Controlled type 2 diabetes mellitus without complication, without long-term current use of insulin ([address]  Comments:  Referral to eye doctor provided for annual and diabetic eye exam  Orders:  -     [facility]  Dietary counseling  -     atorvastatin (LIPITOR) 20 MG tablet; Take 1 tablet by mouth daily     Noretta Hearing and course of illness reviewed. Recommended to rest and take it easy for few days. Apply heating pad to the areas where it is hurting bad. Take Tylenol or Advil prn for pain. Start stretching and relaxing exercises along with physical therapy at home for the back. Muscle relaxer given PRN + prednisone pack, SE informed, advised to not take steroid and Advil together. Patient advised to go to the ED if saddle anethesia, bowel or bladder incontinence occur. Informed if any s/s do not improve, then call us or f/u sooner. Pt agreed. Pt was explained in detail about different types of cholesterols, need to avoid fatty foods, fried foods, red meat, and sweets. Increased intake of vegetables, lean meats, and reduced intake of carbs was emphasized. Exercise discussed. All questions and concerns answered. Patient voiced understanding and agrees with POC. Chronic condition/Plan:  No problem-specific Assessment & Plan notes found for this encounter. Follow up:  Return in about 3 months (around 4/30/2023) for Physical.      Elements of this note have been dictated using speech recognition software. As a result, errors of speech recognition may have occurred.        [facility], DO

## 2023-02-07 ASSESSMENT — ENCOUNTER SYMPTOMS
NAUSEA: 0
COUGH: 0
RHINORRHEA: 0
BLOOD IN STOOL: 0
SINUS PRESSURE: 0
SORE THROAT: 0
EYE REDNESS: 0
CHEST TIGHTNESS: 0
WHEEZING: 0
ABDOMINAL PAIN: 0
BACK PAIN: 1
EYE PAIN: 0
DIARRHEA: 0
VOMITING: 0
CONSTIPATION: 0
SINUS PAIN: 0
SHORTNESS OF BREATH: 0

## 2023-02-10 ENCOUNTER — HOSPITAL ENCOUNTER (OUTPATIENT)
Dept: GENERAL RADIOLOGY | Age: 48
Discharge: HOME | End: 2023-02-10
Payer: COMMERCIAL

## 2023-02-10 DIAGNOSIS — M62.830 BACK MUSCLE SPASM: ICD-10-CM

## 2023-02-10 DIAGNOSIS — M54.50 ACUTE RIGHT-SIDED LOW BACK PAIN WITHOUT SCIATICA: ICD-10-CM

## 2023-02-10 DIAGNOSIS — M54.50 ACUTE MIDLINE LOW BACK PAIN WITHOUT SCIATICA: ICD-10-CM

## 2023-02-10 DIAGNOSIS — M54.50 ACUTE MIDLINE LOW BACK PAIN WITHOUT SCIATICA: Primary | ICD-10-CM

## 2023-02-10 PROCEDURE — 72110 X-RAY EXAM L-2 SPINE 4/>VWS: CPT

## 2023-02-10 RX ORDER — CYCLOBENZAPRINE HCL 10 MG
10 TABLET ORAL 2 TIMES DAILY PRN
Qty: 60 TABLET | Refills: 1 | Status: SHIPPED | OUTPATIENT
Start: 2023-02-10 | End: 2023-03-12

## 2023-02-15 ENCOUNTER — HOSPITAL ENCOUNTER (OUTPATIENT)
Dept: PHYSICAL THERAPY | Age: 48
Setting detail: RECURRING SERIES
Discharge: HOME | End: 2023-02-18
Payer: COMMERCIAL

## 2023-02-15 PROCEDURE — 97140 MANUAL THERAPY 1/> REGIONS: CPT

## 2023-02-15 PROCEDURE — 97161 PT EVAL LOW COMPLEX 20 MIN: CPT

## 2023-02-15 NOTE — THERAPY EVALUATION
Sheryl Garciakwan  : 1975  Primary: [employer]  Secondary:  [address] @ [facility]  [address] [place] [address]  Phone: [phone]  Fax: [phone] Plan Frequency: 2-3x week  Plan of Care/Certification Expiration Date: 23    PT Visit Info:  Plan Frequency: 2-3x week  Plan of Care/Certification Expiration Date: 23    Visit Count:  Visit count could not be calculated. Make sure you are using a visit which is associated with an episode. OUTPATIENT PHYSICAL THERAPY:             OP NOTE TYPE: Initial Assessment 2/15/2023               Episode (No data found) Appt Desk         Treatment Diagnosis:  Generalized Muscle Weakness (M62.81)  Low Back Pain (M54.5)   Acute midline low back pain without sciatica  Acute right-sided low back pain without sciatica  Back muscle spasmAcute midline low back pain without sciatica [M54.50]  Acute right-sided low back pain without sciatica [M54.50]  Back muscle spasm [M62.830]      Medical/Referring Diagnosis:  Acute midline low back pain without sciatica [M54.50]  Acute right-sided low back pain without sciatica [M54.50]  Back muscle spasm [U86.357]  Referring Physician:  Penny Hedrick DO MD Orders:  PT Eval and Treat   Return MD Appt:    Future Appointments   Date Time Provider Kristan Bay   2023  8:30 AM Darlin Black MD [facility]       Date of Onset:       Allergies:  Patient has no known allergies. Restrictions/Precautions:           Medications Last Reviewed:  2/15/2023     SUBJECTIVE   History of Injury/Illness (Reason for Referral): \"So it was about 3 weeks ago I stood up from the chair and felt a strain in my R side of the back. We went out of town that weekend and rode roller coasters and was in so much pain I could barely walk.    I went to MD and went on Mm relaxer and Prednizone and I went through that dose and was in more pain than when I even started. I called back the MD and got more mm relaxers and ibuprofen and went to get an Xray. I have multi level spodylosis. The last two days I've been almost back to normal.  I'm still taking mm relaxer and and ibuprofen. Today I feel like there's hope. Patient Stated Goal(s): \"To return back to baseline. \"  Initial:      0/10 Post Session:      2-3/10  Past Medical History/Comorbidities:   Ms. Spike Saleh  has a past medical history of Abnormal Papanicolaou smear of cervix, Diabetes ([address] and Hypercholesterolemia. Ms. Spike Saleh  has a past surgical history that includes Septoplasty;  section; and LEEP. 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      OBJECTIVE     Observation/Postural and Gait Assessment: Rounded shoulders. Palpation: lower lumbar spine    AROM:   Lumbar extension: Painful, restricted, pain throughout range°   Lumbar flexion: Not painful°   Lumbar left side bend: Painful°   Lumbar right side bend: Painful°     AROM (PROM) Left Right   Hip flexion ° °   Hip extension ° °   Hip external rotation (ER) ° °   Hip internal rotation (IR) ° °   Hip abduction ° °   Hip adduction ° °     Strength:  Manual Muscle Test (out of 5) Left Right   Knee extension 5 5   Knee flexion 4+ 4+   Hip flexion 4 4   Hip extension     Hip abduction     Ankle DF 5 5   Ankle PF 5 5   Gross abdominal strength        Passive Accessory Motion: Hypmobile L/S lower lumbar segments    Special Tests:  - Angelica's, - FABERS, - Slump    Neurological Screen:    Dermatomes: Sensation testing through bilateral lower quadrants for light touch is normal.  Reflexes: Patellar (L4) and Achilles (S1) are 2+ and 2+. Neural tension tests: Passive straight leg raise (SLR) test is -. Crossed SLR test is -. Slump test is -. Femoral nerve stretch test is -. Functional Mobility:  limited walking tolerance. And standing tolerance. ASSESSMENT   Initial Assessment:  MM strain after riding rollercoasters. Xray reveals spondylosis. Extension sensitive, flexion preference. Problem List: (Impacting functional limitations): Body Structures, Functions, Activity Limitations Requiring Skilled Therapeutic Intervention: Decreased functional mobility ; Decreased ADL status; Decreased ROM; Decreased body mechanics; Decreased strength; Decreased safe awareness; Decreased endurance; Decreased cognition     Therapy Prognosis:   Therapy Prognosis: Good     Initial Assessment Complexity:   Decision Making: Low Complexity    PLAN   Effective Dates: 2.15.23 TO Plan of Care/Certification Expiration Date: 05/16/23   Frequency/Duration: Plan Frequency: 2-3x week   Interventions Planned (Treatment may consist of any combination of the following):          Goals: (Goals have been discussed and agreed upon with patient.)  GOALS: (Goals have been discussed and agreed upon with patient.)  Short-Term Functional Goals: Time Frame: 8 weeks  Pt will be compliant with HEP in order to increase LE strength/endurance/mobility to improve functional mobility and overall quality of life. Pt will reduce score on Modified Oswestry Low Back Pain Questionnaire to 10/50 in order to demonstrate improved functional mobility and quality of life. Pt will report sitting for > 60 minutes with minimal to no increase in pain in order to be able to sit for prolonged periods as needed to improve ability to QOL and ADLs. Pt will report standing for > 60 minutes with minimal to no increase in pain in order to be able to stand for prolonged periods as needed to perform ADLs.     Pt will be able to ambulate 400 feet over with least restrictive assistive device in order to improve current LOF. Discharge Goals: Time Frame: 12 weeks   Pt will be independent with HEP in order to increase LE strength/endurance/mobility to improve functional mobility and overall quality of life. Pt will reduce score on Modified Oswestry Low Back Pain Questionnaire to 7/50 in order to demonstrate improved functional mobility and quality of life. Pt will report sitting for > 120 minutes with minimal to no increase in pain in order to be able to sit for prolonged periods as needed to improve ability to QOL. Pt will report standing for > 120 minutes with minimal to no increase in pain in order to be able to stand for prolonged periods as needed to perform ADLs. Pt will increase bilateral strength to >=4+/5 with minimal to no increase in pain in order to improve ability to participate in ADLs. Outcome Measure: Tool Used: Modified Oswestry Low Back Pain Questionnaire  Score:  Initial: 12/50  Most Recent: X/50 (Date: -- )   Interpretation of Score: Each section is scored on a 0-5 scale, 5 representing the greatest disability. The scores of each section are added together for a total score of 50. Medical Necessity:   > Skilled intervention continues to be required due to current LOF. Reason For Services/Other Comments:  > Patient continues to require skilled intervention due to Current LOF. Total Duration:  Time In: 0930  Time Out: 1    Regarding Hamida Dolan's therapy, I certify that the treatment plan above will be carried out by a therapist or under their direction. Thank you for this referral,  Jacy Martinez, PT     Referring Physician Signature:  Polo Martinez DO _______________________________ Date _____________        Post Session Pain  Charge Capture  PT Visit Info MD Guidelines  MyChart

## 2023-02-15 NOTE — PROGRESS NOTES
Preston Rivas  : 1975  Primary: [employer] ([employer]  Secondary:  [address] @ [facility] [address]  Phone: [phone]  Fax: [phone] Plan Frequency: 2-3x week  Plan of Care/Certification Expiration Date: 23    PT Visit Info:  Plan Frequency: 2-3x week  Plan of Care/Certification Expiration Date: 23    Visit Count:  1    OUTPATIENT PHYSICAL THERAPY:OP NOTE TYPE: Treatment Note 2/15/2023       Episode  }Appt Desk             Treatment Diagnosis:    Generalized Muscle Weakness (M62.81)  Low Back Pain (M54.5)   Acute midline low back pain without sciatica  Acute right-sided low back pain without sciatica  Back muscle spasmAcute midline low back pain without sciatica [M54.50]  Acute right-sided low back pain without sciatica [M54.50]  Back muscle spasm [M62.830]      Medical/Referring Diagnosis:  Acute midline low back pain without sciatica [M54.50]  Acute right-sided low back pain without sciatica [M54.50]  Back muscle spasm [Z51.635]  Referring Physician:  Artur Rey DO MD Orders:  PT Eval and Treat   Date of Onset:  No data recorded   Allergies:   Patient has no known allergies. Restrictions/Precautions:  No data recorded  No data recorded   Interventions Planned (Treatment may consist of any combination of the following):    No data recorded     Subjective Comments:     Initial:}     /10Post Session:        /10  Medications Last Reviewed:  2/15/2023  Updated Objective Findings:  See evaluation note from today  Treatment   THERAPEUTIC EXERCISE: (5 minutes):    Exercises per grid below to improve mobility, strength, and balance. Required minimal visual, verbal, and manual cues to promote proper body alignment, promote proper body posture, and promote proper body mechanics. Progressed resistance, range, and repetitions as indicated.      Date:  2/15/23 Date:   Date:     Activity/Exercise Parameters Parameters Parameters   LTR 10X10\"     SKTC 30\"X3     NuStep      Cat Cow      Bridges      Hip isometric supine      Pallof        MANUAL THERAPY: (24 minutes):   Joint mobilization, Soft tissue mobilization, and Manipulation was utilized and necessary because of the patient's restricted joint motion, painful spasm, and loss of articular motion. STM lower and mid back Pas Grade III- L2/3 UPA R AND CPA  Treatment/Session Summary:    Treatment Assessment:  Verbalized understanding of HEP and POC. Communication/Consultation:   Faxed IE to MD  Equipment provided today:  HEP  Recommendations/Intent for next treatment session: Next visit will focus on ROM, mm strength.     Total Treatment Billable Duration:  29 minutes  Time In: 0930  Time Out: [address]  }Post Session Pain  PT Visit [address] Portal  MD Guidelines  Scanned Media  Benefits  MyChart    Future Appointments   Date Time Provider Kristan Bay   2/22/2023  9:30 AM [address], [facility]   2/24/2023  8:45 AM [address], [facility] [place]   2/27/2023  9:30 AM [address], [facility]   4/27/2023  8:30 AM James Macias MD [facility]

## 2023-02-22 ENCOUNTER — APPOINTMENT (OUTPATIENT)
Dept: PHYSICAL THERAPY | Age: 48
End: 2023-02-22
Payer: COMMERCIAL

## 2023-02-24 ENCOUNTER — APPOINTMENT (OUTPATIENT)
Dept: PHYSICAL THERAPY | Age: 48
End: 2023-02-24
Payer: COMMERCIAL

## 2023-02-27 ENCOUNTER — APPOINTMENT (OUTPATIENT)
Dept: PHYSICAL THERAPY | Age: 48
End: 2023-02-27
Payer: COMMERCIAL

## 2023-04-05 DIAGNOSIS — Z71.3 DIETARY COUNSELING: ICD-10-CM

## 2023-04-05 DIAGNOSIS — E78.2 MIXED HYPERLIPIDEMIA: ICD-10-CM

## 2023-04-05 RX ORDER — ATORVASTATIN CALCIUM 20 MG/1
TABLET, FILM COATED ORAL
Qty: 90 TABLET | Refills: 0 | OUTPATIENT
Start: 2023-04-05

## 2023-04-27 ENCOUNTER — OFFICE VISIT (OUTPATIENT)
Dept: OBGYN CLINIC | Age: 48
End: 2023-04-27

## 2023-04-27 VITALS
HEIGHT: 66 IN | DIASTOLIC BLOOD PRESSURE: 86 MMHG | SYSTOLIC BLOOD PRESSURE: 134 MMHG | BODY MASS INDEX: 40.18 KG/M2 | WEIGHT: 250 LBS

## 2023-04-27 DIAGNOSIS — E66.01 OBESITY, CLASS III, BMI 40-49.9 (MORBID OBESITY) (HCC): ICD-10-CM

## 2023-04-27 DIAGNOSIS — Z01.419 ENCNTR FOR GYN EXAM (GENERAL) (ROUTINE) W/O ABN FINDINGS: Primary | ICD-10-CM

## 2023-04-27 RX ORDER — COPPER 313.4 MG/1
1 INTRAUTERINE DEVICE INTRAUTERINE ONCE
COMMUNITY

## 2023-04-27 ASSESSMENT — PATIENT HEALTH QUESTIONNAIRE - PHQ9
SUM OF ALL RESPONSES TO PHQ QUESTIONS 1-9: 0
SUM OF ALL RESPONSES TO PHQ QUESTIONS 1-9: 0
SUM OF ALL RESPONSES TO PHQ9 QUESTIONS 1 & 2: 0
1. LITTLE INTEREST OR PLEASURE IN DOING THINGS: 0
SUM OF ALL RESPONSES TO PHQ QUESTIONS 1-9: 0
SUM OF ALL RESPONSES TO PHQ QUESTIONS 1-9: 0
2. FEELING DOWN, DEPRESSED OR HOPELESS: 0

## 2023-04-27 NOTE — PROGRESS NOTES
Pt comes in today for AE. Pt informed that last AE was 04/28/2022 and that insurance would not cover today's AE since it has not been a full year and one day. LAST PAP: 04/28/2022 negative Negative      LAST MAMMO:  10/2021 pt reports     LMP:  Patient's last menstrual period was 04/09/2023 (exact date).     BIRTH CONTROL:  IUD    TOBACCO USE:  No    FAMILY HISTORY OF:   Breast Cancer:  No   Ovarian Cancer:  No   Uterine Cancer:  No   Colon Cancer:  No    Vitals:    04/27/23 0824   BP: 134/86   Site: Left Upper Arm   Position: Sitting   Weight: 250 lb (113.4 kg)   Height: 5' 6\" (1.676 m)        Constantino Larson MA  04/27/23  8:31 AM

## 2023-06-28 DIAGNOSIS — E11.9 CONTROLLED TYPE 2 DIABETES MELLITUS WITHOUT COMPLICATION, WITHOUT LONG-TERM CURRENT USE OF INSULIN (HCC): ICD-10-CM

## 2023-06-28 DIAGNOSIS — E78.2 MIXED HYPERLIPIDEMIA: ICD-10-CM

## 2023-06-28 RX ORDER — FENOFIBRATE 160 MG/1
TABLET ORAL
Qty: 90 TABLET | Refills: 1 | OUTPATIENT
Start: 2023-06-28

## 2023-06-28 RX ORDER — EMPAGLIFLOZIN 10 MG/1
TABLET, FILM COATED ORAL
Qty: 90 TABLET | Refills: 1 | OUTPATIENT
Start: 2023-06-28

## 2023-07-07 ENCOUNTER — COMMUNITY OUTREACH (OUTPATIENT)
Dept: PRIMARY CARE CLINIC | Facility: CLINIC | Age: 48
End: 2023-07-07

## 2023-07-07 NOTE — PROGRESS NOTES
Patient's HM shows they are overdue for Colonoscopy[facility]  [facility] without success. Gavin Massey

## 2023-07-14 ENCOUNTER — OFFICE VISIT (OUTPATIENT)
Dept: PRIMARY CARE CLINIC | Facility: CLINIC | Age: 48
End: 2023-07-14
Payer: COMMERCIAL

## 2023-07-14 VITALS
OXYGEN SATURATION: 98 % | DIASTOLIC BLOOD PRESSURE: 99 MMHG | HEIGHT: 66 IN | WEIGHT: 254 LBS | HEART RATE: 86 BPM | SYSTOLIC BLOOD PRESSURE: 140 MMHG | BODY MASS INDEX: 40.82 KG/M2 | TEMPERATURE: 97.7 F

## 2023-07-14 DIAGNOSIS — Z71.3 DIETARY COUNSELING: ICD-10-CM

## 2023-07-14 DIAGNOSIS — F41.1 GENERALIZED ANXIETY DISORDER: ICD-10-CM

## 2023-07-14 DIAGNOSIS — Z12.11 COLON CANCER SCREENING: ICD-10-CM

## 2023-07-14 DIAGNOSIS — G43.709 CHRONIC MIGRAINE W/O AURA W/O STATUS MIGRAINOSUS, NOT INTRACTABLE: ICD-10-CM

## 2023-07-14 DIAGNOSIS — Z12.31 ENCOUNTER FOR SCREENING MAMMOGRAM FOR MALIGNANT NEOPLASM OF BREAST: ICD-10-CM

## 2023-07-14 DIAGNOSIS — Z91.89 MULTIPLE RISK FACTORS FOR CORONARY ARTERY DISEASE: ICD-10-CM

## 2023-07-14 DIAGNOSIS — E11.9 CONTROLLED TYPE 2 DIABETES MELLITUS WITHOUT COMPLICATION, WITHOUT LONG-TERM CURRENT USE OF INSULIN (HCC): Primary | ICD-10-CM

## 2023-07-14 DIAGNOSIS — J30.9 CHRONIC ALLERGIC RHINITIS: ICD-10-CM

## 2023-07-14 DIAGNOSIS — E78.2 MIXED HYPERLIPIDEMIA: ICD-10-CM

## 2023-07-14 DIAGNOSIS — E66.01 MORBID OBESITY (HCC): ICD-10-CM

## 2023-07-14 PROCEDURE — 99214 OFFICE O/P EST MOD 30 MIN: CPT | Performed by: FAMILY MEDICINE

## 2023-07-14 PROCEDURE — 3044F HG A1C LEVEL LT 7.0%: CPT | Performed by: FAMILY MEDICINE

## 2023-07-14 RX ORDER — ATORVASTATIN CALCIUM 20 MG/1
20 TABLET, FILM COATED ORAL DAILY
Qty: 90 TABLET | Refills: 1 | Status: SHIPPED | OUTPATIENT
Start: 2023-07-14

## 2023-07-14 RX ORDER — ATORVASTATIN CALCIUM 20 MG/1
20 TABLET, FILM COATED ORAL DAILY
Qty: 90 TABLET | Refills: 1 | Status: SHIPPED | OUTPATIENT
Start: 2023-07-14 | End: 2023-07-14

## 2023-07-14 RX ORDER — MONTELUKAST SODIUM 10 MG/1
10 TABLET ORAL DAILY
Qty: 90 TABLET | Refills: 1 | Status: SHIPPED | OUTPATIENT
Start: 2023-07-14

## 2023-07-14 RX ORDER — LOSARTAN POTASSIUM 25 MG/1
12.5 TABLET ORAL DAILY
Qty: 90 TABLET | Refills: 0 | Status: SHIPPED | OUTPATIENT
Start: 2023-07-14

## 2023-07-14 RX ORDER — FENOFIBRATE 160 MG/1
160 TABLET ORAL DAILY
Qty: 90 TABLET | Refills: 1 | Status: SHIPPED | OUTPATIENT
Start: 2023-07-14

## 2023-07-14 RX ORDER — LOSARTAN POTASSIUM 25 MG/1
12.5 TABLET ORAL DAILY
Qty: 90 TABLET | Refills: 0 | Status: SHIPPED | OUTPATIENT
Start: 2023-07-14 | End: 2023-07-14

## 2023-07-14 RX ORDER — METFORMIN HYDROCHLORIDE 500 MG/1
1000 TABLET, EXTENDED RELEASE ORAL 2 TIMES DAILY
Qty: 360 TABLET | Refills: 1 | Status: SHIPPED | OUTPATIENT
Start: 2023-07-14 | End: 2023-07-14

## 2023-07-14 RX ORDER — EMPAGLIFLOZIN 10 MG/1
10 TABLET, FILM COATED ORAL DAILY
Qty: 90 TABLET | Refills: 1 | Status: SHIPPED | OUTPATIENT
Start: 2023-07-14 | End: 2023-07-14

## 2023-07-14 RX ORDER — EMPAGLIFLOZIN 10 MG/1
10 TABLET, FILM COATED ORAL DAILY
Qty: 90 TABLET | Refills: 1 | Status: SHIPPED | OUTPATIENT
Start: 2023-07-14

## 2023-07-14 RX ORDER — FLUOXETINE HYDROCHLORIDE 20 MG/1
20 CAPSULE ORAL DAILY
Qty: 90 CAPSULE | Refills: 0 | Status: SHIPPED | OUTPATIENT
Start: 2023-07-14

## 2023-07-14 RX ORDER — SUMATRIPTAN 50 MG/1
50 TABLET, FILM COATED ORAL
Qty: 9 TABLET | Refills: 3 | Status: SHIPPED | OUTPATIENT
Start: 2023-07-14 | End: 2023-07-14

## 2023-07-14 RX ORDER — FENOFIBRATE 160 MG/1
160 TABLET ORAL DAILY
Qty: 90 TABLET | Refills: 1 | Status: SHIPPED | OUTPATIENT
Start: 2023-07-14 | End: 2023-07-14

## 2023-07-14 RX ORDER — MONTELUKAST SODIUM 10 MG/1
10 TABLET ORAL DAILY
Qty: 90 TABLET | Refills: 1 | Status: SHIPPED | OUTPATIENT
Start: 2023-07-14 | End: 2023-07-14

## 2023-07-14 RX ORDER — METFORMIN HYDROCHLORIDE 500 MG/1
1000 TABLET, EXTENDED RELEASE ORAL 2 TIMES DAILY
Qty: 360 TABLET | Refills: 1 | Status: SHIPPED | OUTPATIENT
Start: 2023-07-14

## 2023-07-14 ASSESSMENT — PATIENT HEALTH QUESTIONNAIRE - PHQ9
SUM OF ALL RESPONSES TO PHQ9 QUESTIONS 1 & 2: 0
SUM OF ALL RESPONSES TO PHQ QUESTIONS 1-9: 0
2. FEELING DOWN, DEPRESSED OR HOPELESS: 0
1. LITTLE INTEREST OR PLEASURE IN DOING THINGS: 0
SUM OF ALL RESPONSES TO PHQ QUESTIONS 1-9: 0

## 2023-07-21 PROBLEM — Z71.3 DIETARY COUNSELING: Status: ACTIVE | Noted: 2023-07-21

## 2023-07-21 PROBLEM — F41.1 GENERALIZED ANXIETY DISORDER: Status: ACTIVE | Noted: 2023-07-21

## 2023-07-21 PROBLEM — G43.709 CHRONIC MIGRAINE W/O AURA W/O STATUS MIGRAINOSUS, NOT INTRACTABLE: Status: ACTIVE | Noted: 2023-07-21

## 2023-07-21 PROBLEM — Z91.89 MULTIPLE RISK FACTORS FOR CORONARY ARTERY DISEASE: Status: ACTIVE | Noted: 2023-07-21

## 2023-07-21 PROBLEM — J30.9 CHRONIC ALLERGIC RHINITIS: Status: ACTIVE | Noted: 2023-07-21

## 2023-07-21 PROBLEM — E66.01 MORBID OBESITY (HCC): Status: ACTIVE | Noted: 2023-07-21

## 2023-07-21 ASSESSMENT — ENCOUNTER SYMPTOMS
CONSTIPATION: 0
SORE THROAT: 0
SINUS PRESSURE: 0
RHINORRHEA: 0
VOMITING: 0
ABDOMINAL PAIN: 0
SINUS PAIN: 0
BLOOD IN STOOL: 0
NAUSEA: 0
DIARRHEA: 0
BACK PAIN: 0
SHORTNESS OF BREATH: 0
WHEEZING: 0
CHEST TIGHTNESS: 0
COUGH: 0
EYE REDNESS: 0
EYE PAIN: 0

## 2023-07-21 NOTE — ASSESSMENT & PLAN NOTE
Educated about it  Risks high  Educated on compliance   Patient advised that not taking their medications as directed or not doing treatment plans as recommended could lead to serious and fatal disease processes, up-to, and including death. Patient expressed understanding.

## 2023-07-21 NOTE — ASSESSMENT & PLAN NOTE
Letter give in office for veronica  Reviewed med side effects and safety precautions. Educated on relaxation techniques. Monitor and report any panic attacks or chest pains. Avoid stressful situations. Monitor and report any worsening depressive symptoms. Call suicide hotline for any suicidal thoughts or go directly to the emergency room or call 911.

## 2023-07-28 LAB — NONINV COLON CA DNA+OCC BLD SCRN STL QL: NEGATIVE

## 2023-08-04 ENCOUNTER — TELEPHONE (OUTPATIENT)
Dept: PRIMARY CARE CLINIC | Facility: CLINIC | Age: 48
End: 2023-08-04

## 2023-08-08 ENCOUNTER — TELEMEDICINE (OUTPATIENT)
Dept: PRIMARY CARE CLINIC | Facility: CLINIC | Age: 48
End: 2023-08-08
Payer: COMMERCIAL

## 2023-08-08 ENCOUNTER — TELEPHONE (OUTPATIENT)
Dept: PRIMARY CARE CLINIC | Facility: CLINIC | Age: 48
End: 2023-08-08

## 2023-08-08 DIAGNOSIS — J32.9 RHINOSINUSITIS: ICD-10-CM

## 2023-08-08 DIAGNOSIS — J30.9 CHRONIC ALLERGIC RHINITIS: ICD-10-CM

## 2023-08-08 DIAGNOSIS — G43.709 CHRONIC MIGRAINE W/O AURA W/O STATUS MIGRAINOSUS, NOT INTRACTABLE: ICD-10-CM

## 2023-08-08 DIAGNOSIS — U07.1 COVID-19: Primary | ICD-10-CM

## 2023-08-08 DIAGNOSIS — E11.9 CONTROLLED TYPE 2 DIABETES MELLITUS WITHOUT COMPLICATION, WITHOUT LONG-TERM CURRENT USE OF INSULIN (HCC): ICD-10-CM

## 2023-08-08 DIAGNOSIS — Z20.818 EXPOSURE TO STREP THROAT: ICD-10-CM

## 2023-08-08 DIAGNOSIS — E78.2 MIXED HYPERLIPIDEMIA: ICD-10-CM

## 2023-08-08 DIAGNOSIS — J31.0 RHINOSINUSITIS: ICD-10-CM

## 2023-08-08 LAB
GROUP A STREP ANTIGEN, POC: NEGATIVE
VALID INTERNAL CONTROL, POC: YES

## 2023-08-08 PROCEDURE — 99214 OFFICE O/P EST MOD 30 MIN: CPT | Performed by: FAMILY MEDICINE

## 2023-08-08 PROCEDURE — 3044F HG A1C LEVEL LT 7.0%: CPT | Performed by: FAMILY MEDICINE

## 2023-08-08 PROCEDURE — 87880 STREP A ASSAY W/OPTIC: CPT | Performed by: FAMILY MEDICINE

## 2023-08-08 RX ORDER — MONTELUKAST SODIUM 10 MG/1
10 TABLET ORAL DAILY
Qty: 90 TABLET | Refills: 0 | Status: SHIPPED | OUTPATIENT
Start: 2023-08-08

## 2023-08-08 ASSESSMENT — ENCOUNTER SYMPTOMS
ALLERGIC/IMMUNOLOGIC NEGATIVE: 1
SINUS PRESSURE: 1
GASTROINTESTINAL NEGATIVE: 1
SINUS PAIN: 1
EYES NEGATIVE: 1
RHINORRHEA: 1
COUGH: 1

## 2023-08-08 NOTE — TELEPHONE ENCOUNTER
Patient called she went to the urgent care yesterday and she is COVID positive and they do not prescribe  medication and they told her to call her primary care , patient  requesting medicine antibiotic for COVID,can you please contact patient .         Thank you

## 2023-08-09 NOTE — PROGRESS NOTES
[facility] [address], [address] [place] [address]  Office : [phone]  Fax : [phone]  Pt was seen by synchronous (real-time) audio-video technology   I was at my office while conducting this encounter  Pt was at home during the visit  Pts  healthcare decision maker is aware that this patient-initiated Telehealth encounter is a billable service, with coverage as determined by her insurance carrier. She is aware that she may receive a bill and has provided verbal consent to proceed:       Subjective: The patient is a 56 y.o. female  who presents for f/u on 2 days hx of  productive cough,sorethroat,earache,facial pain  and chest/sinus congestion --no fever/cp/sob/wheezing/rash/abd symptoms-pt tried some OTC meds without relief. pt did hoem covid test was positive-pt was seen at an urgent care retest for covid was positive  Pt also was exposed to strep-pts daughter as the same  Pt cannot take paxlovid--several serious interactions with her meds  Pt able to eat and drink  Pt moving around well-no SOB/CP  Pt sleeping good  Pts sp02 96  Pulse in 80s  Bp normal  Blood sugar around 110        Patient Active Problem List   Diagnosis    Controlled type 2 diabetes mellitus without complication, without long-term current use of insulin ([facility])    Mixed hyperlipidemia    Chronic migraine w/o aura w/o status migrainosus, not intractable    Chronic allergic rhinitis    Generalized anxiety disorder    Multiple risk factors for coronary artery disease    Dietary counseling    Morbid obesity ([address])    COVID-19    Exposure to strep throat    Rhinosinusitis       Past Medical History:   Diagnosis Date    Abnormal Papanicolaou smear of cervix     Diabetes ([address])     Hypercholesterolemia        Past Surgical History:   Procedure Laterality Date     SECTION      LEEP      age 26    SEPTOPLASTY         Social History     Socioeconomic History    Marital status:      Spouse name: Not on file

## 2023-08-11 LAB
BACTERIA SPEC CULT: NORMAL
SERVICE CMNT-IMP: NORMAL

## 2023-08-22 ENCOUNTER — HOSPITAL ENCOUNTER (OUTPATIENT)
Dept: MAMMOGRAPHY | Age: 48
Discharge: HOME | End: 2023-08-25
Attending: FAMILY MEDICINE
Payer: COMMERCIAL

## 2023-08-22 DIAGNOSIS — Z12.31 ENCOUNTER FOR SCREENING MAMMOGRAM FOR MALIGNANT NEOPLASM OF BREAST: ICD-10-CM

## 2023-08-22 PROCEDURE — 77067 SCR MAMMO BI INCL CAD: CPT

## 2023-08-31 ENCOUNTER — TELEPHONE (OUTPATIENT)
Dept: PRIMARY CARE CLINIC | Facility: CLINIC | Age: 48
End: 2023-08-31

## 2023-08-31 NOTE — TELEPHONE ENCOUNTER
----- Message from Darya Avila DO sent at 8/30/2023  5:33 PM EDT -----  Please inform that mammogram was negative, repeat in one year.

## 2023-09-17 DIAGNOSIS — F41.1 GENERALIZED ANXIETY DISORDER: ICD-10-CM

## 2023-09-18 RX ORDER — FLUOXETINE HYDROCHLORIDE 20 MG/1
CAPSULE ORAL
Qty: 90 CAPSULE | Refills: 0 | OUTPATIENT
Start: 2023-09-18

## 2023-10-20 ENCOUNTER — OFFICE VISIT (OUTPATIENT)
Dept: PRIMARY CARE CLINIC | Facility: CLINIC | Age: 48
End: 2023-10-20
Payer: COMMERCIAL

## 2023-10-20 VITALS
WEIGHT: 256 LBS | BODY MASS INDEX: 40.18 KG/M2 | OXYGEN SATURATION: 98 % | TEMPERATURE: 97.7 F | HEART RATE: 80 BPM | DIASTOLIC BLOOD PRESSURE: 96 MMHG | SYSTOLIC BLOOD PRESSURE: 140 MMHG | HEIGHT: 67 IN

## 2023-10-20 DIAGNOSIS — E55.9 VITAMIN D DEFICIENCY: ICD-10-CM

## 2023-10-20 DIAGNOSIS — Z00.00 ENCOUNTER FOR ANNUAL PHYSICAL EXAM: Primary | ICD-10-CM

## 2023-10-20 DIAGNOSIS — E78.2 MIXED HYPERLIPIDEMIA: ICD-10-CM

## 2023-10-20 DIAGNOSIS — E66.9 OBESITY (BMI 30-39.9): ICD-10-CM

## 2023-10-20 DIAGNOSIS — K21.9 GASTROESOPHAGEAL REFLUX DISEASE, UNSPECIFIED WHETHER ESOPHAGITIS PRESENT: ICD-10-CM

## 2023-10-20 DIAGNOSIS — E11.9 CONTROLLED TYPE 2 DIABETES MELLITUS WITHOUT COMPLICATION, WITHOUT LONG-TERM CURRENT USE OF INSULIN (HCC): ICD-10-CM

## 2023-10-20 LAB
BILIRUBIN, URINE, POC: NEGATIVE
BLOOD URINE, POC: NEGATIVE
GLUCOSE URINE, POC: 500
KETONES, URINE, POC: NEGATIVE
LEUKOCYTE ESTERASE, URINE, POC: NEGATIVE
NITRITE, URINE, POC: NEGATIVE
PH, URINE, POC: 7 (ref 4.6–8)
PROTEIN,URINE, POC: NEGATIVE
SPECIFIC GRAVITY, URINE, POC: 1.01 (ref 1–1.03)
URINALYSIS CLARITY, POC: CLEAR
URINALYSIS COLOR, POC: YELLOW
UROBILINOGEN, POC: ABNORMAL

## 2023-10-20 PROCEDURE — 81003 URINALYSIS AUTO W/O SCOPE: CPT | Performed by: FAMILY MEDICINE

## 2023-10-20 PROCEDURE — 99396 PREV VISIT EST AGE 40-64: CPT | Performed by: FAMILY MEDICINE

## 2023-10-20 RX ORDER — FENOFIBRATE 145 MG/1
145 TABLET, COATED ORAL DAILY
Qty: 90 TABLET | Refills: 1 | Status: SHIPPED | OUTPATIENT
Start: 2023-10-20

## 2023-10-20 RX ORDER — PANTOPRAZOLE SODIUM 40 MG/1
40 TABLET, DELAYED RELEASE ORAL
Qty: 90 TABLET | Refills: 1 | Status: SHIPPED | OUTPATIENT
Start: 2023-10-20

## 2023-10-20 ASSESSMENT — PATIENT HEALTH QUESTIONNAIRE - PHQ9
1. LITTLE INTEREST OR PLEASURE IN DOING THINGS: 0
SUM OF ALL RESPONSES TO PHQ QUESTIONS 1-9: 0
SUM OF ALL RESPONSES TO PHQ QUESTIONS 1-9: 0
SUM OF ALL RESPONSES TO PHQ9 QUESTIONS 1 & 2: 0
SUM OF ALL RESPONSES TO PHQ QUESTIONS 1-9: 0
2. FEELING DOWN, DEPRESSED OR HOPELESS: 0
SUM OF ALL RESPONSES TO PHQ QUESTIONS 1-9: 0

## 2023-10-20 NOTE — PROGRESS NOTES
Effort: Pulmonary effort is normal. No respiratory distress. Breath sounds: Normal breath sounds. No wheezing, rhonchi or rales. Abdominal:      General: Bowel sounds are normal. There is no distension. Palpations: Abdomen is soft. There is no mass. Tenderness: There is no abdominal tenderness. There is no right CVA tenderness, left CVA tenderness, guarding or rebound. Hernia: No hernia is present. Musculoskeletal:         General: No swelling, tenderness or deformity. Normal range of motion. Cervical back: Normal range of motion and neck supple. No rigidity or tenderness. Right lower leg: No edema. Left lower leg: No edema. Lymphadenopathy:      Cervical: No cervical adenopathy. Skin:     General: Skin is warm. Findings: No bruising, erythema or rash. Neurological:      General: No focal deficit present. Mental Status: She is alert and oriented to person, place, and time. Mental status is at baseline. Cranial Nerves: No cranial nerve deficit. Sensory: No sensory deficit. Motor: No weakness. Coordination: Coordination normal.      Gait: Gait normal.   Psychiatric:         Mood and Affect: Mood normal.         Behavior: Behavior normal. Behavior is cooperative. Thought Content:  Thought content normal.         Judgment: Judgment normal.          Results for orders placed or performed in visit on 10/20/23   AMB POC URINALYSIS DIP STICK AUTO W/O MICRO   Result Value Ref Range    Color, Urine, POC Yellow     Clarity, Urine, POC Clear     Glucose, Urine,  Negative    Bilirubin, Urine, POC Negative Negative    Ketones, Urine, POC Negative Negative    Specific Gravity, Urine, POC 1.015 1.001 - 1.035    Blood, Urine, POC Negative Negative    pH, Urine, POC 7.0 4.6 - 8.0    Protein, Urine, POC Negative Negative    Urobilinogen, POC 0.2 mg/dL     Nitrite, Urine, POC Negative Negative    Leukocyte Esterase, Urine, POC Negative Negative

## 2023-11-01 PROBLEM — K21.9 GASTROESOPHAGEAL REFLUX DISEASE: Status: ACTIVE | Noted: 2023-11-01

## 2023-11-01 PROBLEM — E66.9 OBESITY (BMI 30-39.9): Status: ACTIVE | Noted: 2023-11-01

## 2023-11-01 PROBLEM — E78.2 MIXED HYPERLIPIDEMIA: Status: ACTIVE | Noted: 2022-05-05

## 2023-11-01 PROBLEM — E11.9 CONTROLLED TYPE 2 DIABETES MELLITUS WITHOUT COMPLICATION, WITHOUT LONG-TERM CURRENT USE OF INSULIN (HCC): Status: ACTIVE | Noted: 2022-05-05

## 2023-11-01 ASSESSMENT — ENCOUNTER SYMPTOMS
SINUS PAIN: 0
SHORTNESS OF BREATH: 0
EYE PAIN: 0
SORE THROAT: 0
ABDOMINAL PAIN: 0
BACK PAIN: 0
EYE REDNESS: 0
DIARRHEA: 0
WHEEZING: 0
CONSTIPATION: 0
COUGH: 0
CHEST TIGHTNESS: 0
NAUSEA: 0
SINUS PRESSURE: 0
BLOOD IN STOOL: 0
EYE ITCHING: 0
EYE DISCHARGE: 0
VOMITING: 0
RHINORRHEA: 0

## 2024-01-08 DIAGNOSIS — E11.9 CONTROLLED TYPE 2 DIABETES MELLITUS WITHOUT COMPLICATION, WITHOUT LONG-TERM CURRENT USE OF INSULIN (HCC): ICD-10-CM

## 2024-01-08 DIAGNOSIS — E78.2 MIXED HYPERLIPIDEMIA: ICD-10-CM

## 2024-01-08 RX ORDER — METFORMIN HYDROCHLORIDE 500 MG/1
TABLET, EXTENDED RELEASE ORAL
Qty: 360 TABLET | Refills: 1 | OUTPATIENT
Start: 2024-01-08

## 2024-01-08 RX ORDER — ATORVASTATIN CALCIUM 20 MG/1
20 TABLET, FILM COATED ORAL DAILY
Qty: 90 TABLET | Refills: 1 | OUTPATIENT
Start: 2024-01-08

## 2024-01-14 DIAGNOSIS — E11.9 CONTROLLED TYPE 2 DIABETES MELLITUS WITHOUT COMPLICATION, WITHOUT LONG-TERM CURRENT USE OF INSULIN (HCC): ICD-10-CM

## 2024-01-15 ENCOUNTER — APPOINTMENT (RX ONLY)
Dept: URBAN - METROPOLITAN AREA CLINIC 329 | Facility: CLINIC | Age: 49
Setting detail: DERMATOLOGY
End: 2024-01-15

## 2024-01-15 DIAGNOSIS — D18.0 HEMANGIOMA: ICD-10-CM

## 2024-01-15 DIAGNOSIS — D22 MELANOCYTIC NEVI: ICD-10-CM

## 2024-01-15 DIAGNOSIS — L81.4 OTHER MELANIN HYPERPIGMENTATION: ICD-10-CM

## 2024-01-15 DIAGNOSIS — L82.1 OTHER SEBORRHEIC KERATOSIS: ICD-10-CM

## 2024-01-15 PROBLEM — D22.5 MELANOCYTIC NEVI OF TRUNK: Status: ACTIVE | Noted: 2024-01-15

## 2024-01-15 PROBLEM — D22.62 MELANOCYTIC NEVI OF LEFT UPPER LIMB, INCLUDING SHOULDER: Status: ACTIVE | Noted: 2024-01-15

## 2024-01-15 PROBLEM — D48.5 NEOPLASM OF UNCERTAIN BEHAVIOR OF SKIN: Status: ACTIVE | Noted: 2024-01-15

## 2024-01-15 PROBLEM — D18.01 HEMANGIOMA OF SKIN AND SUBCUTANEOUS TISSUE: Status: ACTIVE | Noted: 2024-01-15

## 2024-01-15 PROCEDURE — ? FULL BODY SKIN EXAM

## 2024-01-15 PROCEDURE — 11102 TANGNTL BX SKIN SINGLE LES: CPT

## 2024-01-15 PROCEDURE — ? COUNSELING

## 2024-01-15 PROCEDURE — ? BIOPSY BY SHAVE METHOD

## 2024-01-15 PROCEDURE — ? BODY PHOTOGRAPHY

## 2024-01-15 PROCEDURE — ? TREATMENT REGIMEN

## 2024-01-15 PROCEDURE — 99203 OFFICE O/P NEW LOW 30 MIN: CPT | Mod: 25

## 2024-01-15 ASSESSMENT — LOCATION ZONE DERM
LOCATION ZONE: TRUNK
LOCATION ZONE: HAND
LOCATION ZONE: FINGER

## 2024-01-15 ASSESSMENT — LOCATION SIMPLE DESCRIPTION DERM
LOCATION SIMPLE: UPPER BACK
LOCATION SIMPLE: RIGHT UPPER BACK
LOCATION SIMPLE: LEFT HAND
LOCATION SIMPLE: LEFT INDEX FINGER

## 2024-01-15 ASSESSMENT — LOCATION DETAILED DESCRIPTION DERM
LOCATION DETAILED: LEFT DORSAL MIDDLE METACARPOPHALANGEAL JOINT
LOCATION DETAILED: INFERIOR THORACIC SPINE
LOCATION DETAILED: LEFT PROXIMAL DORSAL INDEX FINGER
LOCATION DETAILED: RIGHT SUPERIOR UPPER BACK
LOCATION DETAILED: LEFT RADIAL DORSAL HAND

## 2024-01-15 NOTE — PROCEDURE: BODY PHOTOGRAPHY
Detail Level: Zone
Was The Entire Body Photographed (Cannot Bill Unless Entire Body Photographed)?: No
Consent was obtained for whole body photography. Patient understands that photograph costs may not be covered by insurance.
Whole Body Statement: The whole body was photographed today.
Reason For Photography: The patient is obtaining whole body photography to observe existing suspicious moles and or monitor for the appearance of any new lesions.

## 2024-01-15 NOTE — PROCEDURE: BIOPSY BY SHAVE METHOD
Detail Level: Detailed
Depth Of Biopsy: dermis
Was A Bandage Applied: Yes
Size Of Lesion In Cm: 0.3
X Size Of Lesion In Cm: 0
Biopsy Type: H and E
Biopsy Method: Dermablade
Anesthesia Type: 1% lidocaine with epinephrine and a 1:10 solution of 8.4% sodium bicarbonate
Anesthesia Volume In Cc: 0.5
Hemostasis: Aluminum Chloride
Wound Care: Petrolatum
Dressing: bandage
Destruction After The Procedure: No
Type Of Destruction Used: Curettage
Curettage Text: The wound bed was treated with curettage after the biopsy was performed.
Cryotherapy Text: The wound bed was treated with cryotherapy after the biopsy was performed.
Electrodesiccation Text: The wound bed was treated with electrodesiccation after the biopsy was performed.
Electrodesiccation And Curettage Text: The wound bed was treated with electrodesiccation and curettage after the biopsy was performed.
Silver Nitrate Text: The wound bed was treated with silver nitrate after the biopsy was performed.
Lab: 6
Lab Facility: 3
Consent was obtained and risks were reviewed including but not limited to scarring, infection, bleeding, scabbing, incomplete removal, nerve damage and allergy to anesthesia.
Post-Care Instructions: I reviewed with the patient in detail post-care instructions. Patient is to keep the biopsy site dry overnight, and then apply petrolatum twice daily until healed.
Notification Instructions: Patient will be notified of biopsy results. However, patient instructed to call the office if not contacted within 2 weeks.
Billing Type: Third-Party Bill
Information: Selecting Yes will display possible errors in your note based on the variables you have selected. This validation is only offered as a suggestion for you. PLEASE NOTE THAT THE VALIDATION TEXT WILL BE REMOVED WHEN YOU FINALIZE YOUR NOTE. IF YOU WANT TO FAX A PRELIMINARY NOTE YOU WILL NEED TO TOGGLE THIS TO 'NO' IF YOU DO NOT WANT IT IN YOUR FAXED NOTE.

## 2024-01-18 RX ORDER — EMPAGLIFLOZIN 10 MG/1
10 TABLET, FILM COATED ORAL DAILY
Qty: 90 TABLET | Refills: 1 | OUTPATIENT
Start: 2024-01-18

## 2024-03-05 ENCOUNTER — TELEPHONE (OUTPATIENT)
Dept: INTERNAL MEDICINE CLINIC | Facility: CLINIC | Age: 49
End: 2024-03-05

## 2024-03-05 DIAGNOSIS — J30.9 CHRONIC ALLERGIC RHINITIS: ICD-10-CM

## 2024-03-05 DIAGNOSIS — E78.2 MIXED HYPERLIPIDEMIA: ICD-10-CM

## 2024-03-05 DIAGNOSIS — E11.9 CONTROLLED TYPE 2 DIABETES MELLITUS WITHOUT COMPLICATION, WITHOUT LONG-TERM CURRENT USE OF INSULIN (HCC): ICD-10-CM

## 2024-03-05 DIAGNOSIS — F41.1 GENERALIZED ANXIETY DISORDER: ICD-10-CM

## 2024-03-05 RX ORDER — ATORVASTATIN CALCIUM 20 MG/1
20 TABLET, FILM COATED ORAL DAILY
Qty: 90 TABLET | Refills: 1 | OUTPATIENT
Start: 2024-03-05

## 2024-03-05 RX ORDER — EMPAGLIFLOZIN 10 MG/1
10 TABLET, FILM COATED ORAL DAILY
Qty: 90 TABLET | Refills: 0 | Status: SHIPPED | OUTPATIENT
Start: 2024-03-05

## 2024-03-05 RX ORDER — ATORVASTATIN CALCIUM 20 MG/1
20 TABLET, FILM COATED ORAL DAILY
Qty: 90 TABLET | Refills: 0 | Status: SHIPPED | OUTPATIENT
Start: 2024-03-05

## 2024-03-05 RX ORDER — LOSARTAN POTASSIUM 25 MG/1
12.5 TABLET ORAL DAILY
Qty: 90 TABLET | Refills: 0 | Status: SHIPPED | OUTPATIENT
Start: 2024-03-05

## 2024-03-05 RX ORDER — METFORMIN HYDROCHLORIDE 500 MG/1
1000 TABLET, EXTENDED RELEASE ORAL 2 TIMES DAILY
Qty: 360 TABLET | Refills: 0 | Status: SHIPPED | OUTPATIENT
Start: 2024-03-05

## 2024-03-05 RX ORDER — MONTELUKAST SODIUM 10 MG/1
10 TABLET ORAL DAILY
Qty: 90 TABLET | Refills: 0 | Status: SHIPPED | OUTPATIENT
Start: 2024-03-05

## 2024-03-05 RX ORDER — METFORMIN HYDROCHLORIDE 500 MG/1
TABLET, EXTENDED RELEASE ORAL
Qty: 360 TABLET | Refills: 1 | OUTPATIENT
Start: 2024-03-05

## 2024-03-05 RX ORDER — FLUOXETINE HYDROCHLORIDE 20 MG/1
20 CAPSULE ORAL DAILY
Qty: 90 CAPSULE | Refills: 0 | Status: CANCELLED | OUTPATIENT
Start: 2024-03-05

## 2024-03-05 NOTE — TELEPHONE ENCOUNTER
----- Message from Ishaan Cobb sent at 3/5/2024  1:40 PM EST -----  Subject: Refill Request    QUESTIONS  Name of Medication? JARDIANCE 10 MG tablet  Patient-reported dosage and instructions? one per day  How many days do you have left? 7  Preferred Pharmacy? [facility] phone number (if available)? [phone]  ---------------------------------------------------------------------------  --------------,  Name of Medication? atorvastatin (LIPITOR) 20 MG tablet  Patient-reported dosage and instructions? 20mg one per day  How many days do you have left? 0  Preferred Pharmacy? [facility] phone number (if available)? [phone]  ---------------------------------------------------------------------------  --------------,  Name of Medication? metFORMIN (GLUCOPHAGE-XR) 500 MG extended release   tablet  Patient-reported dosage and instructions? 500mg XR 4 per day  How many days do you have left? 12  Preferred Pharmacy? [facility] phone number (if available)? [phone]  ---------------------------------------------------------------------------  --------------  CALL BACK INFO  What is the best way for the office to contact you? OK to leave message on   voicemail  Preferred Call Back Phone Number? [phone]  ---------------------------------------------------------------------------  --------------  SCRIPT ANSWERS  Relationship to Patient? Self

## 2024-03-06 ENCOUNTER — TELEPHONE (OUTPATIENT)
Dept: FAMILY MEDICINE CLINIC | Facility: CLINIC | Age: 49
End: 2024-03-06

## 2024-03-06 DIAGNOSIS — F41.1 GENERALIZED ANXIETY DISORDER: ICD-10-CM

## 2024-03-06 RX ORDER — FLUOXETINE HYDROCHLORIDE 20 MG/1
20 CAPSULE ORAL DAILY
Qty: 90 CAPSULE | Refills: 1 | Status: SHIPPED | OUTPATIENT
Start: 2024-03-06

## 2024-03-06 NOTE — TELEPHONE ENCOUNTER
----- Message from Debra Levy MA sent at 3/6/2024 10:13 AM EST -----  Subject: Message to Provider    QUESTIONS  Information for Provider? Pt returned the missed call. Yes she is taking   Prozac daily.  ---------------------------------------------------------------------------  --------------  CALL BACK INFO  [phone]; OK to leave message on voicemail  ---------------------------------------------------------------------------  --------------  SCRIPT ANSWERS  undefined

## 2024-03-18 DIAGNOSIS — K21.9 GASTROESOPHAGEAL REFLUX DISEASE, UNSPECIFIED WHETHER ESOPHAGITIS PRESENT: ICD-10-CM

## 2024-03-18 DIAGNOSIS — E78.2 MIXED HYPERLIPIDEMIA: ICD-10-CM

## 2024-03-20 RX ORDER — FENOFIBRATE 145 MG/1
145 TABLET, COATED ORAL DAILY
Qty: 90 TABLET | Refills: 1 | OUTPATIENT
Start: 2024-03-20

## 2024-03-20 RX ORDER — PANTOPRAZOLE SODIUM 40 MG/1
40 TABLET, DELAYED RELEASE ORAL
Qty: 90 TABLET | Refills: 1 | OUTPATIENT
Start: 2024-03-20

## 2024-04-17 DIAGNOSIS — Z00.00 ENCOUNTER FOR ANNUAL PHYSICAL EXAM: ICD-10-CM

## 2024-04-17 DIAGNOSIS — E55.9 VITAMIN D DEFICIENCY: ICD-10-CM

## 2024-04-17 DIAGNOSIS — E78.2 MIXED HYPERLIPIDEMIA: ICD-10-CM

## 2024-04-17 LAB
25(OH)D3 SERPL-MCNC: 40.5 NG/ML (ref 30–100)
ALBUMIN SERPL-MCNC: 4.1 G/DL (ref 3.5–5)
ALBUMIN/GLOB SERPL: 1.2 (ref 0.4–1.6)
ALP SERPL-CCNC: 68 U/L (ref 50–136)
ALT SERPL-CCNC: 50 U/L (ref 12–65)
ANION GAP SERPL CALC-SCNC: 7 MMOL/L (ref 2–11)
AST SERPL-CCNC: 33 U/L (ref 15–37)
BILIRUB SERPL-MCNC: 0.7 MG/DL (ref 0.2–1.1)
BUN SERPL-MCNC: 18 MG/DL (ref 6–23)
CALCIUM SERPL-MCNC: 9.5 MG/DL (ref 8.3–10.4)
CHLORIDE SERPL-SCNC: 103 MMOL/L (ref 103–113)
CHOLEST SERPL-MCNC: 164 MG/DL
CO2 SERPL-SCNC: 29 MMOL/L (ref 21–32)
CREAT SERPL-MCNC: 0.7 MG/DL (ref 0.6–1)
GLOBULIN SER CALC-MCNC: 3.3 G/DL (ref 2.8–4.5)
GLUCOSE SERPL-MCNC: 133 MG/DL (ref 65–100)
HDLC SERPL-MCNC: 36 MG/DL (ref 40–60)
HDLC SERPL: 4.6
LDLC SERPL CALC-MCNC: 49.2 MG/DL
POTASSIUM SERPL-SCNC: 4.1 MMOL/L (ref 3.5–5.1)
PROT SERPL-MCNC: 7.4 G/DL (ref 6.3–8.2)
SODIUM SERPL-SCNC: 139 MMOL/L (ref 136–146)
TRIGL SERPL-MCNC: 394 MG/DL (ref 35–150)
VLDLC SERPL CALC-MCNC: 78.8 MG/DL (ref 6–23)

## 2024-04-23 ENCOUNTER — TELEMEDICINE (OUTPATIENT)
Dept: INTERNAL MEDICINE CLINIC | Facility: CLINIC | Age: 49
End: 2024-04-23
Payer: COMMERCIAL

## 2024-04-23 DIAGNOSIS — K21.9 GASTROESOPHAGEAL REFLUX DISEASE, UNSPECIFIED WHETHER ESOPHAGITIS PRESENT: ICD-10-CM

## 2024-04-23 DIAGNOSIS — F41.1 GENERALIZED ANXIETY DISORDER: ICD-10-CM

## 2024-04-23 DIAGNOSIS — E78.2 MIXED HYPERLIPIDEMIA: ICD-10-CM

## 2024-04-23 DIAGNOSIS — G43.709 CHRONIC MIGRAINE W/O AURA W/O STATUS MIGRAINOSUS, NOT INTRACTABLE: ICD-10-CM

## 2024-04-23 DIAGNOSIS — E66.9 OBESITY (BMI 30-39.9): ICD-10-CM

## 2024-04-23 DIAGNOSIS — J30.9 CHRONIC ALLERGIC RHINITIS: ICD-10-CM

## 2024-04-23 DIAGNOSIS — E11.9 CONTROLLED TYPE 2 DIABETES MELLITUS WITHOUT COMPLICATION, WITHOUT LONG-TERM CURRENT USE OF INSULIN (HCC): Primary | ICD-10-CM

## 2024-04-23 DIAGNOSIS — Z12.11 COLON CANCER SCREENING: ICD-10-CM

## 2024-04-23 PROCEDURE — 99214 OFFICE O/P EST MOD 30 MIN: CPT | Performed by: FAMILY MEDICINE

## 2024-04-23 RX ORDER — EMPAGLIFLOZIN 10 MG/1
10 TABLET, FILM COATED ORAL DAILY
Qty: 90 TABLET | Refills: 0 | Status: SHIPPED | OUTPATIENT
Start: 2024-04-23

## 2024-04-23 RX ORDER — SUMATRIPTAN 50 MG/1
50 TABLET, FILM COATED ORAL
Qty: 9 TABLET | Refills: 5 | Status: SHIPPED | OUTPATIENT
Start: 2024-04-23 | End: 2024-04-23

## 2024-04-23 RX ORDER — ICOSAPENT ETHYL 1000 MG/1
2 CAPSULE ORAL 2 TIMES DAILY
Qty: 360 CAPSULE | Refills: 3 | Status: SHIPPED | OUTPATIENT
Start: 2024-04-23 | End: 2024-07-22

## 2024-04-23 RX ORDER — METFORMIN HYDROCHLORIDE 500 MG/1
1000 TABLET, EXTENDED RELEASE ORAL 2 TIMES DAILY
Qty: 360 TABLET | Refills: 0 | Status: SHIPPED | OUTPATIENT
Start: 2024-04-23

## 2024-04-23 RX ORDER — MONTELUKAST SODIUM 10 MG/1
10 TABLET ORAL DAILY
Qty: 90 TABLET | Refills: 1 | Status: SHIPPED | OUTPATIENT
Start: 2024-04-23

## 2024-04-23 RX ORDER — FENOFIBRATE 160 MG/1
160 TABLET ORAL DAILY
Qty: 90 TABLET | Refills: 2 | Status: SHIPPED | OUTPATIENT
Start: 2024-04-23

## 2024-04-23 RX ORDER — LOSARTAN POTASSIUM 25 MG/1
12.5 TABLET ORAL DAILY
Qty: 90 TABLET | Refills: 1 | Status: SHIPPED | OUTPATIENT
Start: 2024-04-23

## 2024-04-23 RX ORDER — ATORVASTATIN CALCIUM 20 MG/1
20 TABLET, FILM COATED ORAL DAILY
Qty: 90 TABLET | Refills: 1 | Status: SHIPPED | OUTPATIENT
Start: 2024-04-23

## 2024-04-23 RX ORDER — FLUOXETINE HYDROCHLORIDE 20 MG/1
20 CAPSULE ORAL DAILY
Qty: 90 CAPSULE | Refills: 1 | Status: SHIPPED | OUTPATIENT
Start: 2024-04-23

## 2024-04-23 RX ORDER — PANTOPRAZOLE SODIUM 40 MG/1
40 TABLET, DELAYED RELEASE ORAL
Qty: 90 TABLET | Refills: 1 | Status: SHIPPED | OUTPATIENT
Start: 2024-04-23

## 2024-04-23 RX ORDER — ONDANSETRON 4 MG/1
4 TABLET, ORALLY DISINTEGRATING ORAL EVERY 12 HOURS PRN
Qty: 21 TABLET | Refills: 1 | Status: SHIPPED | OUTPATIENT
Start: 2024-04-23

## 2024-04-23 SDOH — ECONOMIC STABILITY: FOOD INSECURITY: WITHIN THE PAST 12 MONTHS, YOU WORRIED THAT YOUR FOOD WOULD RUN OUT BEFORE YOU GOT MONEY TO BUY MORE.: NEVER TRUE

## 2024-04-23 SDOH — ECONOMIC STABILITY: INCOME INSECURITY: HOW HARD IS IT FOR YOU TO PAY FOR THE VERY BASICS LIKE FOOD, HOUSING, MEDICAL CARE, AND HEATING?: NOT HARD AT ALL

## 2024-04-23 SDOH — ECONOMIC STABILITY: FOOD INSECURITY: WITHIN THE PAST 12 MONTHS, THE FOOD YOU BOUGHT JUST DIDN'T LAST AND YOU DIDN'T HAVE MONEY TO GET MORE.: NEVER TRUE

## 2024-04-23 ASSESSMENT — ENCOUNTER SYMPTOMS
BLOOD IN STOOL: 0
SHORTNESS OF BREATH: 0
DIARRHEA: 0
NAUSEA: 0
ABDOMINAL PAIN: 0
RHINORRHEA: 0
VOMITING: 0
EYE PAIN: 0
SINUS PAIN: 0
CHEST TIGHTNESS: 0
SINUS PRESSURE: 0
WHEEZING: 0
COUGH: 0
BACK PAIN: 0
EYE REDNESS: 0
CONSTIPATION: 0
SORE THROAT: 0

## 2024-04-23 ASSESSMENT — PATIENT HEALTH QUESTIONNAIRE - PHQ9
SUM OF ALL RESPONSES TO PHQ QUESTIONS 1-9: 0
SUM OF ALL RESPONSES TO PHQ QUESTIONS 1-9: 0
SUM OF ALL RESPONSES TO PHQ9 QUESTIONS 1 & 2: 0
1. LITTLE INTEREST OR PLEASURE IN DOING THINGS: NOT AT ALL
SUM OF ALL RESPONSES TO PHQ QUESTIONS 1-9: 0
SUM OF ALL RESPONSES TO PHQ QUESTIONS 1-9: 0
2. FEELING DOWN, DEPRESSED OR HOPELESS: NOT AT ALL

## 2024-04-23 NOTE — PROGRESS NOTES
losartan (COZAAR) 25 MG tablet; Take 0.5 tablets by mouth daily, Disp-90 tablet, R-1Fill only when next neededNormal  -     atorvastatin (LIPITOR) 20 MG tablet; Take 1 tablet by mouth daily, Disp-90 tablet, R-1Fill only when next neededNormal  -     Comprehensive Metabolic Panel; Future  -     CBC with Auto Differential; Future  -     Microalbumin / Creatinine Urine Ratio; Future  -     TSH; Future  -     Hemoglobin A1C; Future  2. Mixed hyperlipidemia  Comments:  fenofibrate + statin (increased dose) + diet and exercise discussed extensively  Overview:  Statin: yes.  Tolerating medication well and achieving desired therapeutic response. Will continue with lipitor + increased fenofibrate + added vascepa. Reviewed side effects and safety precautions. Will recheck lipid levels. Encourage healthy eating and exercise as able.  Recent labs reviewed  Orders:  -     fenofibrate (TRIGLIDE) 160 MG tablet; Take 1 tablet by mouth daily, Disp-90 tablet, R-2Normal  -     Icosapent Ethyl (VASCEPA) 1 g CAPS capsule; Take 2 capsules by mouth 2 times daily, Disp-360 capsule, R-3Normal  -     atorvastatin (LIPITOR) 20 MG tablet; Take 1 tablet by mouth daily, Disp-90 tablet, R-1Fill only when next neededNormal  -     Comprehensive Metabolic Panel; Future  -     CBC with Auto Differential; Future  -     TSH; Future  -     Lipid Panel; Future  3. Gastroesophageal reflux disease, unspecified whether esophagitis present  Overview:  On PPI  Reviewed side effects and safety precautions  GERD instructions reviewed with patient--  1. Limit caffeine and alcohol consumption to one serving daily. May need to eliminate totally if symptoms do not resolve.  2. Avoid known triggers.  3. Elevate head of bed on 6 inch blocks.  4. Avoid eating and drinking within 2 hours of bedtime.  5. Avoid spicy and fried foods.  Orders:  -     pantoprazole (PROTONIX) 40 MG tablet; Take 1 tablet by mouth every morning (before breakfast), Disp-90 tablet, R-1Fill only

## 2024-04-29 ENCOUNTER — TELEPHONE (OUTPATIENT)
Dept: FAMILY MEDICINE CLINIC | Facility: CLINIC | Age: 49
End: 2024-04-29

## 2024-04-29 DIAGNOSIS — G43.709 CHRONIC MIGRAINE W/O AURA W/O STATUS MIGRAINOSUS, NOT INTRACTABLE: ICD-10-CM

## 2024-04-29 NOTE — TELEPHONE ENCOUNTER
Pharmacy needs clarification on max daily dose for her SUMAtriptan.  See note below for further details

## 2024-04-29 NOTE — TELEPHONE ENCOUNTER
----- Message from Johanna Barnard sent at 4/25/2024  8:38 AM EDT -----  Subject: Medication Problem     Medication: SUMAtriptan (IMITREX) 50 MG tablet  Dosage: 50 MG   Ordering Provider: SUZANNE HAM    Question/Problem: PHARMACY IS CALLING TO GET THE \"MAX DAILY DOSE\" WRITTEN   ON THE PRESCRIPTION. USUALLY THE MAX IS 2 DAILY, THEY CANNOT FILL UNTIL   CLARIFIED. VERBAL TO THE PHARMACY ACCEPTABLE FOR THIS CALL,       Pharmacy: [facility] - [facility] [address], [address] - P [phone] - F [phone]    ---------------------------------------------------------------------------  --------------  CALL BACK INFO  [phone]; Do not leave any message, patient will call back for answer  ---------------------------------------------------------------------------  --------------    SCRIPT ANSWERS  Relationship to Patient: Covered Entity  Covered Entity Type: Pharmacy?  Representative Name: IVETTE

## 2024-04-30 RX ORDER — SUMATRIPTAN 50 MG/1
50 TABLET, FILM COATED ORAL
Qty: 9 TABLET | Refills: 5 | Status: SHIPPED | OUTPATIENT
Start: 2024-04-30 | End: 2024-04-30

## 2024-05-21 ENCOUNTER — OFFICE VISIT (OUTPATIENT)
Age: 49
End: 2024-05-21
Payer: COMMERCIAL

## 2024-05-21 VITALS
WEIGHT: 263 LBS | RESPIRATION RATE: 17 BRPM | HEIGHT: 67 IN | SYSTOLIC BLOOD PRESSURE: 129 MMHG | DIASTOLIC BLOOD PRESSURE: 77 MMHG | OXYGEN SATURATION: 97 % | HEART RATE: 82 BPM | BODY MASS INDEX: 41.28 KG/M2

## 2024-05-21 DIAGNOSIS — E66.01 OBESITY, CLASS III, BMI 40-49.9 (MORBID OBESITY) (HCC): ICD-10-CM

## 2024-05-21 DIAGNOSIS — K21.9 GASTROESOPHAGEAL REFLUX DISEASE, UNSPECIFIED WHETHER ESOPHAGITIS PRESENT: Primary | ICD-10-CM

## 2024-05-21 DIAGNOSIS — Z80.0 FAMILY HX OF COLON CANCER REQUIRING SCREENING COLONOSCOPY: ICD-10-CM

## 2024-05-21 DIAGNOSIS — K52.9 CHRONIC DIARRHEA: ICD-10-CM

## 2024-05-21 PROCEDURE — 99204 OFFICE O/P NEW MOD 45 MIN: CPT | Performed by: PHYSICIAN ASSISTANT

## 2024-05-21 NOTE — PROGRESS NOTES
Hamida Dolan (:  1975) is a 48 y.o. female new patient referred to our office for evaluation of the following chief complaint(s):  New Patient        Assessment & Plan   ASSESSMENT/PLAN:  1. Gastroesophageal reflux disease, unspecified whether esophagitis present  2. Chronic diarrhea  3. Family hx of colon cancer requiring screening colonoscopy  4. Obesity, Class III, BMI 40-49.9 (morbid obesity) (HCC)      -Counseled patient and provided written recommendations for diet and lifestyle modifications for GERD. Avoid tobacco, alcohol, NSAIDs. Currently taking Protonix 40 mg daily and starting to see some improvement. Has 8-10 year hx of GERD with no prior EGD. Will schedule.  -Recent diagnosis of colon cancer in her sister. Will schedule first surveillance colonoscopy.  -Hx chronic diarrhea which started with Metformin use.     Subjective   SUBJECTIVE/OBJECTIVE  Hamida Dolan is a 48 y.o. year old female with PMH pertinent for HPL, NIDDM, GERD, obesity with BMI 41.03, anxiety, migraines. Surgical history is pertinent for , LEEP.     Patient was referred to our office for evaluation of GERD and colon cancer screening. Referral note reviewed from 2024. No prior GI or endoscopic evaluation discovered on chart review.    Today patient reports hx of chronic diarrhea which she attributes to Metformin use. Typically has 3-4 stools daily, rarely solid. Sometimes has urgency and fecal incontinence. This improved slightly with transition to XR formula. Denies melena, hematochezia, mucus in the stool. No prior cholecystectomy, pancreatitis. She reports chronic acid reflux x 8-10 years and was recently started on Protonix a month ago and has had some relief. Her dentist noticed dental erosions and was concerned for reflux. Denies nocturnal reflux or diarrhea. Denies nausea, vomiting except with occasional migraine headaches. Denies dysphagia or odynophagia.     Denies prior EGD. She reports a remote

## 2024-05-21 NOTE — PATIENT INSTRUCTIONS
For reflux:   Eat smaller and more frequent meals. Avoid lying down for 3 hours after eating. Elevate the head of the bed by 6 inches. Avoid wearing tight-fitting clothing. Stop smoking and avoid alcohol. Avoid NSAID medications (Aspirin, Excedrin, Aleve, Ibuprofen, Goody Powder, Toradol, Mobic, Voltaren, etc). Consider weight loss to get to a healthy weight, which is often critical to eliminating symptoms of reflux. Avoid foods and acid-containing beverages that can exacerbate the symptoms of reflux. This includes:     -Caffeine  -Chocolate  -Peppermint  -Alcohol (especially red wine)  -Carbonated beverages  -Citrus fruits  -Tomato-based products  -Vinegar  -Spicy and greasy foods     For diarrhea:   Take Imodium as needed for diarrhea. If you are frequently having urgent diarrhea following meals, schedule Imodium dose 30 minutes prior to meals. Be sure to stay well hydrated. Avoid alcohol as well as food and beverages high in fat, sugar, artificial sweeteners or stimulants such as nicotine or caffeine. Increase daily fiber intake to 25-35 grams daily either by dietary intake or an over-the-counter psyllium husk fiber supplement. Keep a diet journal to evaluate for any food triggers.

## 2024-06-04 ENCOUNTER — TELEPHONE (OUTPATIENT)
Dept: GASTROENTEROLOGY | Age: 49
End: 2024-06-04

## 2024-06-04 NOTE — TELEPHONE ENCOUNTER
Return for scheduled EGD, scheduled colonoscopy, follow-up visit 1-2 weeks after procedure.   Called patient to schedule  / lmovm

## 2024-06-05 ENCOUNTER — TELEPHONE (OUTPATIENT)
Dept: GASTROENTEROLOGY | Age: 49
End: 2024-06-05

## 2024-06-05 NOTE — TELEPHONE ENCOUNTER
Spoke with patient about scheduling EGD she had questions about the cost with her [employer] insurance  / I explained to patient this was considered a diag surgery that based off her benefits she would be responsible for her remaining ded and a 20% co insurance oop.  She stated that she would need to hold off for now and would call back when she was able to reschedule

## 2024-06-05 NOTE — TELEPHONE ENCOUNTER
Return for scheduled EGD, scheduled colonoscopy, follow-up visit 1-2 weeks after procedure.   Called patient to schedule  / lmovm / Mailed out letter to patients address

## 2024-06-07 ENCOUNTER — TELEPHONE (OUTPATIENT)
Dept: GASTROENTEROLOGY | Age: 49
End: 2024-06-07

## 2024-07-08 DIAGNOSIS — E11.9 CONTROLLED TYPE 2 DIABETES MELLITUS WITHOUT COMPLICATION, WITHOUT LONG-TERM CURRENT USE OF INSULIN (HCC): ICD-10-CM

## 2024-07-09 NOTE — TELEPHONE ENCOUNTER
Patient requesting refill of metformin, does not currently have an appointment scheduled. Per Dr. KAUFMAN's last note on 4/23/24, patient to return in about 6 months around 10/23/24 for physical, labs prior to appt.

## 2024-07-24 RX ORDER — METFORMIN HYDROCHLORIDE 500 MG/1
1000 TABLET, EXTENDED RELEASE ORAL 2 TIMES DAILY
Qty: 360 TABLET | Refills: 1 | Status: SHIPPED | OUTPATIENT
Start: 2024-07-24

## 2024-10-07 DIAGNOSIS — G43.709 CHRONIC MIGRAINE W/O AURA W/O STATUS MIGRAINOSUS, NOT INTRACTABLE: ICD-10-CM

## 2024-10-07 DIAGNOSIS — J30.9 CHRONIC ALLERGIC RHINITIS: ICD-10-CM

## 2024-10-07 DIAGNOSIS — K21.9 GASTROESOPHAGEAL REFLUX DISEASE, UNSPECIFIED WHETHER ESOPHAGITIS PRESENT: ICD-10-CM

## 2024-10-07 DIAGNOSIS — E78.2 MIXED HYPERLIPIDEMIA: ICD-10-CM

## 2024-10-07 DIAGNOSIS — F41.1 GENERALIZED ANXIETY DISORDER: ICD-10-CM

## 2024-10-07 DIAGNOSIS — E11.9 CONTROLLED TYPE 2 DIABETES MELLITUS WITHOUT COMPLICATION, WITHOUT LONG-TERM CURRENT USE OF INSULIN (HCC): ICD-10-CM

## 2024-10-07 LAB
ALBUMIN SERPL-MCNC: 4.2 G/DL (ref 3.5–5)
ALBUMIN/GLOB SERPL: 1.3 (ref 1–1.9)
ALP SERPL-CCNC: 79 U/L (ref 35–104)
ALT SERPL-CCNC: 112 U/L (ref 8–45)
ANION GAP SERPL CALC-SCNC: 13 MMOL/L (ref 9–18)
AST SERPL-CCNC: 127 U/L (ref 15–37)
BASOPHILS # BLD: 0 K/UL (ref 0–0.2)
BASOPHILS NFR BLD: 1 % (ref 0–2)
BILIRUB SERPL-MCNC: 0.3 MG/DL (ref 0–1.2)
BUN SERPL-MCNC: 23 MG/DL (ref 6–23)
CALCIUM SERPL-MCNC: 9.4 MG/DL (ref 8.8–10.2)
CHLORIDE SERPL-SCNC: 102 MMOL/L (ref 98–107)
CHOLEST SERPL-MCNC: 138 MG/DL (ref 0–200)
CO2 SERPL-SCNC: 24 MMOL/L (ref 20–28)
CREAT SERPL-MCNC: 0.69 MG/DL (ref 0.6–1.1)
CREAT UR-MCNC: 127 MG/DL (ref 28–217)
DIFFERENTIAL METHOD BLD: ABNORMAL
EOSINOPHIL # BLD: 0.1 K/UL (ref 0–0.8)
EOSINOPHIL NFR BLD: 2 % (ref 0.5–7.8)
ERYTHROCYTE [DISTWIDTH] IN BLOOD BY AUTOMATED COUNT: 14.1 % (ref 11.9–14.6)
EST. AVERAGE GLUCOSE BLD GHB EST-MCNC: 166 MG/DL
GLOBULIN SER CALC-MCNC: 3.3 G/DL (ref 2.3–3.5)
GLUCOSE SERPL-MCNC: 152 MG/DL (ref 70–99)
HBA1C MFR BLD: 7.4 % (ref 0–5.6)
HCT VFR BLD AUTO: 47.4 % (ref 35.8–46.3)
HDLC SERPL-MCNC: 30 MG/DL (ref 40–60)
HDLC SERPL: 4.6 (ref 0–5)
HGB BLD-MCNC: 14.7 G/DL (ref 11.7–15.4)
IMM GRANULOCYTES # BLD AUTO: 0.1 K/UL (ref 0–0.5)
IMM GRANULOCYTES NFR BLD AUTO: 1 % (ref 0–5)
LDLC SERPL CALC-MCNC: 28 MG/DL (ref 0–100)
LYMPHOCYTES # BLD: 2.4 K/UL (ref 0.5–4.6)
LYMPHOCYTES NFR BLD: 37 % (ref 13–44)
MCH RBC QN AUTO: 25.6 PG (ref 26.1–32.9)
MCHC RBC AUTO-ENTMCNC: 31 G/DL (ref 31.4–35)
MCV RBC AUTO: 82.6 FL (ref 82–102)
MICROALBUMIN UR-MCNC: <1.2 MG/DL (ref 0–20)
MICROALBUMIN/CREAT UR-RTO: NORMAL MG/G (ref 0–30)
MONOCYTES # BLD: 0.5 K/UL (ref 0.1–1.3)
MONOCYTES NFR BLD: 8 % (ref 4–12)
NEUTS SEG # BLD: 3.2 K/UL (ref 1.7–8.2)
NEUTS SEG NFR BLD: 51 % (ref 43–78)
NRBC # BLD: 0 K/UL (ref 0–0.2)
PLATELET # BLD AUTO: 302 K/UL (ref 150–450)
PMV BLD AUTO: 10.4 FL (ref 9.4–12.3)
POTASSIUM SERPL-SCNC: 4.6 MMOL/L (ref 3.5–5.1)
PROT SERPL-MCNC: 7.5 G/DL (ref 6.3–8.2)
RBC # BLD AUTO: 5.74 M/UL (ref 4.05–5.2)
SODIUM SERPL-SCNC: 139 MMOL/L (ref 136–145)
TRIGL SERPL-MCNC: 398 MG/DL (ref 0–150)
TSH, 3RD GENERATION: 0.72 UIU/ML (ref 0.27–4.2)
VLDLC SERPL CALC-MCNC: 80 MG/DL (ref 6–23)
WBC # BLD AUTO: 6.3 K/UL (ref 4.3–11.1)

## 2024-10-11 ENCOUNTER — OFFICE VISIT (OUTPATIENT)
Dept: INTERNAL MEDICINE CLINIC | Facility: CLINIC | Age: 49
End: 2024-10-11
Payer: COMMERCIAL

## 2024-10-11 VITALS
DIASTOLIC BLOOD PRESSURE: 81 MMHG | BODY MASS INDEX: 40.81 KG/M2 | SYSTOLIC BLOOD PRESSURE: 133 MMHG | HEIGHT: 67 IN | TEMPERATURE: 97.3 F | OXYGEN SATURATION: 97 % | HEART RATE: 88 BPM | WEIGHT: 260 LBS | RESPIRATION RATE: 19 BRPM

## 2024-10-11 DIAGNOSIS — E11.9 CONTROLLED TYPE 2 DIABETES MELLITUS WITHOUT COMPLICATION, WITHOUT LONG-TERM CURRENT USE OF INSULIN (HCC): Primary | ICD-10-CM

## 2024-10-11 DIAGNOSIS — R74.8 ELEVATED LIVER ENZYMES: ICD-10-CM

## 2024-10-11 DIAGNOSIS — F41.1 GENERALIZED ANXIETY DISORDER: ICD-10-CM

## 2024-10-11 DIAGNOSIS — E78.2 MIXED HYPERLIPIDEMIA: ICD-10-CM

## 2024-10-11 DIAGNOSIS — J30.9 CHRONIC ALLERGIC RHINITIS: ICD-10-CM

## 2024-10-11 DIAGNOSIS — K21.9 GASTROESOPHAGEAL REFLUX DISEASE, UNSPECIFIED WHETHER ESOPHAGITIS PRESENT: ICD-10-CM

## 2024-10-11 DIAGNOSIS — G43.709 CHRONIC MIGRAINE W/O AURA W/O STATUS MIGRAINOSUS, NOT INTRACTABLE: ICD-10-CM

## 2024-10-11 PROCEDURE — 3051F HG A1C>EQUAL 7.0%<8.0%: CPT | Performed by: FAMILY MEDICINE

## 2024-10-11 PROCEDURE — 99214 OFFICE O/P EST MOD 30 MIN: CPT | Performed by: FAMILY MEDICINE

## 2024-10-11 RX ORDER — FLUOXETINE 10 MG/1
30 CAPSULE ORAL DAILY
Qty: 270 CAPSULE | Refills: 1 | Status: SHIPPED | OUTPATIENT
Start: 2024-10-11

## 2024-10-11 RX ORDER — PANTOPRAZOLE SODIUM 40 MG/1
40 TABLET, DELAYED RELEASE ORAL
Qty: 90 TABLET | Refills: 1 | Status: SHIPPED | OUTPATIENT
Start: 2024-10-11

## 2024-10-11 RX ORDER — LOSARTAN POTASSIUM 25 MG/1
12.5 TABLET ORAL DAILY
Qty: 90 TABLET | Refills: 1 | Status: SHIPPED | OUTPATIENT
Start: 2024-10-11

## 2024-10-11 RX ORDER — ONDANSETRON 4 MG/1
4 TABLET, ORALLY DISINTEGRATING ORAL EVERY 12 HOURS PRN
Qty: 21 TABLET | Refills: 1 | Status: SHIPPED | OUTPATIENT
Start: 2024-10-11

## 2024-10-11 RX ORDER — METFORMIN HCL 500 MG
1000 TABLET, EXTENDED RELEASE 24 HR ORAL 2 TIMES DAILY
Qty: 360 TABLET | Refills: 1 | Status: SHIPPED | OUTPATIENT
Start: 2024-10-11

## 2024-10-11 RX ORDER — SUMATRIPTAN 50 MG/1
TABLET, FILM COATED ORAL
Qty: 9 TABLET | Refills: 5 | Status: SHIPPED | OUTPATIENT
Start: 2024-10-11

## 2024-10-11 RX ORDER — ATORVASTATIN CALCIUM 20 MG/1
20 TABLET, FILM COATED ORAL DAILY
Qty: 90 TABLET | Refills: 1 | Status: SHIPPED | OUTPATIENT
Start: 2024-10-11

## 2024-10-11 RX ORDER — FENOFIBRATE 160 MG/1
160 TABLET ORAL DAILY
Qty: 90 TABLET | Refills: 2 | Status: SHIPPED | OUTPATIENT
Start: 2024-10-11

## 2024-10-11 RX ORDER — MONTELUKAST SODIUM 10 MG/1
10 TABLET ORAL DAILY
Qty: 90 TABLET | Refills: 1 | Status: SHIPPED | OUTPATIENT
Start: 2024-10-11

## 2024-10-11 RX ORDER — EMPAGLIFLOZIN 10 MG/1
10 TABLET, FILM COATED ORAL DAILY
Qty: 90 TABLET | Refills: 1 | Status: SHIPPED | OUTPATIENT
Start: 2024-10-11

## 2024-10-11 NOTE — PROGRESS NOTES
fenofibrate + added vascepa. Reviewed side effects and safety precautions. Will recheck lipid levels. Encourage healthy eating and exercise as able.  Recent labs reviewed  Orders:  -     fenofibrate 160 MG tablet; Take 1 tablet by mouth daily, Disp-90 tablet, R-2Normal  -     atorvastatin (LIPITOR) 20 MG tablet; Take 1 tablet by mouth daily, Disp-90 tablet, R-1Fill only when next neededNormal  -     Comprehensive Metabolic Panel; Future  -     Lipid Panel; Future  3. Elevated liver enzymes  Overview:  Both of her liver enzymes is a little elevated, informed about it. DDX discussed.  Most likely due to some mild fatty liver changes. We generally do not worry until levels are 3 times normal limits.  Continue to watch diet and exercise and let's recheck hepatic function panel in 3 months  Orders:  -     Comprehensive Metabolic Panel; Future  4. Chronic migraine w/o aura w/o status migrainosus, not intractable  Overview:  Imitrex + zofran PRN  Stable on med, continue it  Refilled  Advised keeping headache diary.  Instructions on how to use medication properly.  Neurologic warning signs reviewed and instructed to go to ER if these occur.  Further workup pending clinical course.  Will let me know for any worsening symptoms.  Orders:  -     SUMAtriptan (IMITREX) 50 MG tablet; Take as needed for Migraines (repeat after 2 hours as needed). Max daily dose of 100 mg., Disp-9 tablet, R-5Fill only when next neededNormal  -     ondansetron (ZOFRAN-ODT) 4 MG disintegrating tablet; Take 1 tablet by mouth every 12 hours as needed for Nausea or Vomiting, Disp-21 tablet, R-1Fill only when next neededNormal  -     Comprehensive Metabolic Panel; Future  5. Gastroesophageal reflux disease, unspecified whether esophagitis present  Overview:  On PPI  Reviewed side effects and safety precautions  GERD instructions reviewed with patient--  1. Limit caffeine and alcohol consumption to one serving daily. May need to eliminate totally if

## 2025-01-22 SDOH — ECONOMIC STABILITY: FOOD INSECURITY: WITHIN THE PAST 12 MONTHS, THE FOOD YOU BOUGHT JUST DIDN'T LAST AND YOU DIDN'T HAVE MONEY TO GET MORE.: NEVER TRUE

## 2025-01-22 SDOH — ECONOMIC STABILITY: FOOD INSECURITY: WITHIN THE PAST 12 MONTHS, YOU WORRIED THAT YOUR FOOD WOULD RUN OUT BEFORE YOU GOT MONEY TO BUY MORE.: NEVER TRUE

## 2025-01-22 SDOH — ECONOMIC STABILITY: INCOME INSECURITY: IN THE LAST 12 MONTHS, WAS THERE A TIME WHEN YOU WERE NOT ABLE TO PAY THE MORTGAGE OR RENT ON TIME?: NO

## 2025-01-22 ASSESSMENT — PATIENT HEALTH QUESTIONNAIRE - PHQ9
SUM OF ALL RESPONSES TO PHQ QUESTIONS 1-9: 0
SUM OF ALL RESPONSES TO PHQ QUESTIONS 1-9: 0
SUM OF ALL RESPONSES TO PHQ9 QUESTIONS 1 & 2: 0
SUM OF ALL RESPONSES TO PHQ QUESTIONS 1-9: 0
1. LITTLE INTEREST OR PLEASURE IN DOING THINGS: NOT AT ALL
SUM OF ALL RESPONSES TO PHQ QUESTIONS 1-9: 0
2. FEELING DOWN, DEPRESSED OR HOPELESS: NOT AT ALL
SUM OF ALL RESPONSES TO PHQ9 QUESTIONS 1 & 2: 0
2. FEELING DOWN, DEPRESSED OR HOPELESS: NOT AT ALL
1. LITTLE INTEREST OR PLEASURE IN DOING THINGS: NOT AT ALL

## 2025-01-23 DIAGNOSIS — G43.709 CHRONIC MIGRAINE W/O AURA W/O STATUS MIGRAINOSUS, NOT INTRACTABLE: ICD-10-CM

## 2025-01-23 DIAGNOSIS — R74.8 ELEVATED LIVER ENZYMES: ICD-10-CM

## 2025-01-23 DIAGNOSIS — E78.2 MIXED HYPERLIPIDEMIA: ICD-10-CM

## 2025-01-23 DIAGNOSIS — K21.9 GASTROESOPHAGEAL REFLUX DISEASE, UNSPECIFIED WHETHER ESOPHAGITIS PRESENT: ICD-10-CM

## 2025-01-23 DIAGNOSIS — E11.9 CONTROLLED TYPE 2 DIABETES MELLITUS WITHOUT COMPLICATION, WITHOUT LONG-TERM CURRENT USE OF INSULIN (HCC): ICD-10-CM

## 2025-01-23 DIAGNOSIS — F41.1 GENERALIZED ANXIETY DISORDER: ICD-10-CM

## 2025-01-23 LAB
ALBUMIN SERPL-MCNC: 3.9 G/DL (ref 3.5–5)
ALBUMIN/GLOB SERPL: 1.2 (ref 1–1.9)
ALP SERPL-CCNC: 74 U/L (ref 35–104)
ALT SERPL-CCNC: 118 U/L (ref 8–45)
ANION GAP SERPL CALC-SCNC: 12 MMOL/L (ref 7–16)
AST SERPL-CCNC: 131 U/L (ref 15–37)
BILIRUB SERPL-MCNC: 0.3 MG/DL (ref 0–1.2)
BUN SERPL-MCNC: 14 MG/DL (ref 6–23)
CALCIUM SERPL-MCNC: 9.7 MG/DL (ref 8.8–10.2)
CHLORIDE SERPL-SCNC: 103 MMOL/L (ref 98–107)
CHOLEST SERPL-MCNC: 180 MG/DL (ref 0–200)
CO2 SERPL-SCNC: 26 MMOL/L (ref 20–29)
CREAT SERPL-MCNC: 0.67 MG/DL (ref 0.6–1.1)
EST. AVERAGE GLUCOSE BLD GHB EST-MCNC: 177 MG/DL
GLOBULIN SER CALC-MCNC: 3.2 G/DL (ref 2.3–3.5)
GLUCOSE SERPL-MCNC: 149 MG/DL (ref 70–99)
HBA1C MFR BLD: 7.8 % (ref 0–5.6)
HDLC SERPL-MCNC: 33 MG/DL (ref 40–60)
HDLC SERPL: 5.4 (ref 0–5)
LDLC SERPL CALC-MCNC: 74 MG/DL (ref 0–100)
POTASSIUM SERPL-SCNC: 4.3 MMOL/L (ref 3.5–5.1)
PROT SERPL-MCNC: 7.1 G/DL (ref 6.3–8.2)
SODIUM SERPL-SCNC: 140 MMOL/L (ref 136–145)
TRIGL SERPL-MCNC: 365 MG/DL (ref 0–150)
VLDLC SERPL CALC-MCNC: 73 MG/DL (ref 6–23)

## 2025-01-24 ENCOUNTER — OFFICE VISIT (OUTPATIENT)
Dept: INTERNAL MEDICINE CLINIC | Facility: CLINIC | Age: 50
End: 2025-01-24
Payer: COMMERCIAL

## 2025-01-24 VITALS
SYSTOLIC BLOOD PRESSURE: 147 MMHG | HEART RATE: 91 BPM | DIASTOLIC BLOOD PRESSURE: 81 MMHG | TEMPERATURE: 97.5 F | BODY MASS INDEX: 41.28 KG/M2 | OXYGEN SATURATION: 97 % | WEIGHT: 263 LBS | HEIGHT: 67 IN

## 2025-01-24 DIAGNOSIS — G43.709 CHRONIC MIGRAINE W/O AURA W/O STATUS MIGRAINOSUS, NOT INTRACTABLE: ICD-10-CM

## 2025-01-24 DIAGNOSIS — M54.50 ACUTE ON CHRONIC LOW BACK PAIN: ICD-10-CM

## 2025-01-24 DIAGNOSIS — J30.9 CHRONIC ALLERGIC RHINITIS: ICD-10-CM

## 2025-01-24 DIAGNOSIS — E78.2 MIXED HYPERLIPIDEMIA: ICD-10-CM

## 2025-01-24 DIAGNOSIS — E11.9 CONTROLLED TYPE 2 DIABETES MELLITUS WITHOUT COMPLICATION, WITHOUT LONG-TERM CURRENT USE OF INSULIN (HCC): Primary | ICD-10-CM

## 2025-01-24 DIAGNOSIS — G89.29 ACUTE ON CHRONIC LOW BACK PAIN: ICD-10-CM

## 2025-01-24 DIAGNOSIS — E66.9 OBESITY (BMI 30-39.9): ICD-10-CM

## 2025-01-24 DIAGNOSIS — F41.1 GENERALIZED ANXIETY DISORDER: ICD-10-CM

## 2025-01-24 DIAGNOSIS — K21.9 GASTROESOPHAGEAL REFLUX DISEASE, UNSPECIFIED WHETHER ESOPHAGITIS PRESENT: ICD-10-CM

## 2025-01-24 DIAGNOSIS — R74.8 ELEVATED LIVER ENZYMES: ICD-10-CM

## 2025-01-24 PROCEDURE — 99214 OFFICE O/P EST MOD 30 MIN: CPT | Performed by: FAMILY MEDICINE

## 2025-01-24 PROCEDURE — 3051F HG A1C>EQUAL 7.0%<8.0%: CPT | Performed by: FAMILY MEDICINE

## 2025-01-24 RX ORDER — ICOSAPENT ETHYL 1 G/1
2 CAPSULE ORAL 2 TIMES DAILY
Qty: 360 CAPSULE | Refills: 1 | Status: SHIPPED | OUTPATIENT
Start: 2025-01-24 | End: 2025-04-24

## 2025-01-24 RX ORDER — SUMATRIPTAN 50 MG/1
TABLET, FILM COATED ORAL
Qty: 9 TABLET | Refills: 5 | Status: SHIPPED | OUTPATIENT
Start: 2025-01-24

## 2025-01-24 RX ORDER — METHYLPREDNISOLONE 4 MG/1
TABLET ORAL
Qty: 21 TABLET | Refills: 0 | Status: SHIPPED | OUTPATIENT
Start: 2025-01-24 | End: 2025-01-30

## 2025-01-24 RX ORDER — METHYLPREDNISOLONE 4 MG/1
TABLET ORAL
Qty: 21 TABLET | Refills: 0 | Status: SHIPPED | OUTPATIENT
Start: 2025-01-24 | End: 2025-01-24

## 2025-01-24 RX ORDER — FENOFIBRATE 160 MG/1
160 TABLET ORAL DAILY
Qty: 90 TABLET | Refills: 1 | Status: SHIPPED | OUTPATIENT
Start: 2025-01-24

## 2025-01-24 RX ORDER — CYCLOBENZAPRINE HCL 10 MG
10 TABLET ORAL 2 TIMES DAILY PRN
Qty: 60 TABLET | Refills: 0 | Status: SHIPPED | OUTPATIENT
Start: 2025-01-24

## 2025-01-24 RX ORDER — ATORVASTATIN CALCIUM 20 MG/1
20 TABLET, FILM COATED ORAL DAILY
Qty: 90 TABLET | Refills: 1 | Status: SHIPPED | OUTPATIENT
Start: 2025-01-24

## 2025-01-24 RX ORDER — CYCLOBENZAPRINE HCL 10 MG
10 TABLET ORAL 2 TIMES DAILY PRN
Qty: 60 TABLET | Refills: 0 | Status: SHIPPED | OUTPATIENT
Start: 2025-01-24 | End: 2025-01-24

## 2025-01-24 RX ORDER — LOSARTAN POTASSIUM 25 MG/1
12.5 TABLET ORAL DAILY
Qty: 90 TABLET | Refills: 1 | Status: SHIPPED | OUTPATIENT
Start: 2025-01-24

## 2025-01-24 RX ORDER — MONTELUKAST SODIUM 10 MG/1
10 TABLET ORAL DAILY
Qty: 90 TABLET | Refills: 1 | Status: SHIPPED | OUTPATIENT
Start: 2025-01-24

## 2025-01-24 RX ORDER — ONDANSETRON 4 MG/1
4 TABLET, ORALLY DISINTEGRATING ORAL EVERY 12 HOURS PRN
Qty: 21 TABLET | Refills: 1 | Status: SHIPPED | OUTPATIENT
Start: 2025-01-24

## 2025-01-24 RX ORDER — EMPAGLIFLOZIN 25 MG/1
25 TABLET, FILM COATED ORAL DAILY
Qty: 90 TABLET | Refills: 1 | Status: SHIPPED | OUTPATIENT
Start: 2025-01-24

## 2025-01-24 RX ORDER — METFORMIN HYDROCHLORIDE 500 MG/1
1000 TABLET, EXTENDED RELEASE ORAL 2 TIMES DAILY
Qty: 360 TABLET | Refills: 1 | Status: SHIPPED | OUTPATIENT
Start: 2025-01-24

## 2025-01-24 RX ORDER — FLUOXETINE 10 MG/1
30 CAPSULE ORAL DAILY
Qty: 270 CAPSULE | Refills: 1 | Status: SHIPPED | OUTPATIENT
Start: 2025-01-24

## 2025-01-24 RX ORDER — PANTOPRAZOLE SODIUM 40 MG/1
40 TABLET, DELAYED RELEASE ORAL
Qty: 90 TABLET | Refills: 1 | Status: SHIPPED | OUTPATIENT
Start: 2025-01-24

## 2025-01-24 NOTE — PROGRESS NOTES
Kelly Smith,   [facility] [place] [address]  Phone [phone]  Fax [phone]      Hamida Dolan (: 1975) is a 49 y.o. female, here for evaluation of the following chief complaint(s):  Follow-up and Lower Back Pain       ASSESSMENT/PLAN:  1. Controlled type 2 diabetes mellitus without complication, without long-term current use of insulin (HCC)  Overview:  Tolerating medication well and achieving desired therapeutic response.  Will continue with:   Diabetic Medications       Biguanides       metFORMIN (GLUCOPHAGE-XR) 500 MG extended release tablet Take 2 tablets by mouth in the morning and at bedtime       Sodium-Glucose Co-Transporter 2 (SGLT2) Inhibitors       JARDIANCE 25 MG tablet Take 1 tablet by mouth daily        .  A1c levels reviewed--will recheck. Encourage routine foot care. Recommend routine eye exams.  Encourage diet and exercise and medication adherence.   Educated about diet and exercise extensively  Increased jardiance to 25 mg today after lab review, Reviewed side effects, interactions, and safety precautions.     Orders:  -     losartan (COZAAR) 25 MG tablet; Take 0.5 tablets by mouth daily, Disp-90 tablet, R-1Fill only when next neededNormal  -     metFORMIN (GLUCOPHAGE-XR) 500 MG extended release tablet; Take 2 tablets by mouth in the morning and at bedtime, Disp-360 tablet, R-1Normal  -     atorvastatin (LIPITOR) 20 MG tablet; Take 1 tablet by mouth daily, Disp-90 tablet, R-1Fill only when next neededNormal  -     JARDIANCE 25 MG tablet; Take 1 tablet by mouth daily, Disp-90 tablet, R-1, DAWNormal  -     Comprehensive Metabolic Panel; Future  -     CBC with Auto Differential; Future  -     Hemoglobin A1C; Future  -     Lipid Panel; Future  2. Mixed hyperlipidemia  Overview:  Statin: yes.  Tolerating medication well and achieving desired therapeutic response. Will continue with lipitor + increased fenofibrate + added

## 2025-01-27 PROBLEM — G89.29 ACUTE ON CHRONIC LOW BACK PAIN: Status: ACTIVE | Noted: 2025-01-27

## 2025-01-27 PROBLEM — M54.50 ACUTE ON CHRONIC LOW BACK PAIN: Status: ACTIVE | Noted: 2025-01-27

## 2025-02-13 ENCOUNTER — TELEPHONE (OUTPATIENT)
Age: 50
End: 2025-02-13

## 2025-02-13 NOTE — TELEPHONE ENCOUNTER
RN on the phone with pt's  regarding 's care with Dr. Grubbs. Once completed discussing pt's 's information, pt's  brought pt onto the phone. Pt's  stating that pt has been trying to call our office multiple times since last June to reschedule colonoscopy. Pt's  requesting someone reach out to pt to schedule procedure and confirmed pt's phone number correct in chart.     Told pt and  that RN will send message to clinic supervisor to inform her that pt has been calling main clinic number [phone] and has been unable to get in touch with anyone for multiple months to schedule. Supervisor to have  reach out to pt if appropriate.     Routed to Zora Dillon, supervisor, to address.

## 2025-02-17 ENCOUNTER — TELEPHONE (OUTPATIENT)
Age: 50
End: 2025-02-17

## 2025-02-17 ENCOUNTER — PATIENT MESSAGE (OUTPATIENT)
Age: 50
End: 2025-02-17

## 2025-02-17 ENCOUNTER — PREP FOR PROCEDURE (OUTPATIENT)
Age: 50
End: 2025-02-17

## 2025-02-17 DIAGNOSIS — Z12.11 ENCOUNTER FOR SCREENING COLONOSCOPY: ICD-10-CM

## 2025-02-17 NOTE — TELEPHONE ENCOUNTER
Called patient to reschedule colon and egd [place] w/ [facility]   / rescheduled patient  for  02/21/2025 only the colonoscopy / patient stated she did not want to reschedule the egd

## 2025-02-17 NOTE — PERIOP NOTE
Patient name, , and procedure verified.    Type: 1a; abbreviated assessment per anesthesia guidelines    Labs per anesthesia: none    Instructed will receive notificattion the day before procedure by the GI Lab of time of arrival for [facility] cases, and by [facility]. Arrival times should be called by 3 pm. If no phone is received the patient should contact their respective hospital. The GI lab telephone number is [phone] and ES Pre-op is [phone].     Follow diet and prep instructions per office including NPO status.  If patient has NOT received instructions from office patient is advised to call surgeon office, verbalizes understanding.    Bath or shower the night before and the am of surgery with non-mositurizing soap. No lotions, oils, powders, cologne on skin. No make up, eye make up or jewelry. Wear loose fitting comfortable, clean clothing.     Must have adult present in building the entire time .     PLEASE DO NOT STOP ANY PRESCRIPTION MEDICATIONS UNLESS SPECIFIED BELOW:  PRESCRIPTION MEDICATIONS TO HOLD : Jardiance - hold 3 days prior to procedure    Please stop all vitamins & supplements 7 days prior to surgery and stop all NSAIDS (Aspirin/Excedrin/BC & Goody Powder, ibuprofen/Motrin/Advil, naproxen/Aleve) 5 days before your surgery. Should you have a surgery date that does not allow for the amount of time instructed above, please stop taking vitamins, supplements, and NSAIDS IMMEDIATELY.    TAKE ONLY THE FOLLOWING MEDICATION ON THE DAY OF SURGERY:   Atorvastatin (Lipotor)  Metformin (Glucophage)  Fluoxetine (Prozac)   Pantoprazole (Protonix)      The following discharge instructions reviewed : medication given during procedure may cause drowsiness for several hours, therefore, patient must not drive or operate machinery for remainder of the day.Patient may not drink alcohol on the day of your procedure, and should resume regular diet and activity unless otherwise directed.  You may experience abdominal distention for several hours that is relieved by the passage of gas. Contact your physician if you have any of the following: fever or chills, severe abdominal pain or excessive amount of bleeding or a large amount when having a bowel movement. Occasional specks of blood with bowel movement would not be unusual.  Please bring the following that apply: CPAP or Bi-Pap, portable oxygen, rescue inhalers, remote for spinal cord stimulator or any electronic remote implant, and post-operative supplies such as cold therapy pad, sling, brace, shoe or boot as it applies to your case.      You may be required to pay a deductible or co-pay on the day of your procedure. You can pre-pay by calling [phone] if your surgery is at the [facility] or [phone] if your surgery is at the [facility].

## 2025-02-18 RX ORDER — SODIUM CHLORIDE 0.9 % (FLUSH) 0.9 %
5-40 SYRINGE (ML) INJECTION PRN
Status: CANCELLED | OUTPATIENT
Start: 2025-02-18

## 2025-02-18 RX ORDER — SODIUM CHLORIDE 9 MG/ML
25 INJECTION, SOLUTION INTRAVENOUS PRN
Status: CANCELLED | OUTPATIENT
Start: 2025-02-18

## 2025-02-18 RX ORDER — SODIUM CHLORIDE 0.9 % (FLUSH) 0.9 %
5-40 SYRINGE (ML) INJECTION EVERY 12 HOURS SCHEDULED
Status: CANCELLED | OUTPATIENT
Start: 2025-02-18

## 2025-02-20 ENCOUNTER — ANESTHESIA EVENT (OUTPATIENT)
Dept: ENDOSCOPY | Age: 50
End: 2025-02-20
Payer: COMMERCIAL

## 2025-02-20 NOTE — PROGRESS NOTES
RN called Pt to confirm appointment time of 0740, arrival time 0620, location,  requirement, and instructions for registration at the hospital.  Pt verbalized understanding.

## 2025-02-21 ENCOUNTER — TELEPHONE (OUTPATIENT)
Dept: GASTROENTEROLOGY | Age: 50
End: 2025-02-21

## 2025-02-21 ENCOUNTER — ANESTHESIA (OUTPATIENT)
Dept: ENDOSCOPY | Age: 50
End: 2025-02-21
Payer: COMMERCIAL

## 2025-02-21 ENCOUNTER — HOSPITAL ENCOUNTER (OUTPATIENT)
Age: 50
Discharge: HOME | End: 2025-02-21
Attending: STUDENT IN AN ORGANIZED HEALTH CARE EDUCATION/TRAINING PROGRAM | Admitting: STUDENT IN AN ORGANIZED HEALTH CARE EDUCATION/TRAINING PROGRAM
Payer: COMMERCIAL

## 2025-02-21 VITALS
BODY MASS INDEX: 39.4 KG/M2 | DIASTOLIC BLOOD PRESSURE: 81 MMHG | OXYGEN SATURATION: 98 % | HEIGHT: 68 IN | HEART RATE: 83 BPM | WEIGHT: 260 LBS | TEMPERATURE: 98.2 F | SYSTOLIC BLOOD PRESSURE: 129 MMHG | RESPIRATION RATE: 18 BRPM

## 2025-02-21 DIAGNOSIS — Z12.11 ENCOUNTER FOR SCREENING COLONOSCOPY: ICD-10-CM

## 2025-02-21 LAB
GLUCOSE BLD STRIP.AUTO-MCNC: 173 MG/DL (ref 65–100)
SERVICE CMNT-IMP: ABNORMAL

## 2025-02-21 PROCEDURE — 45380 COLONOSCOPY AND BIOPSY: CPT | Performed by: STUDENT IN AN ORGANIZED HEALTH CARE EDUCATION/TRAINING PROGRAM

## 2025-02-21 PROCEDURE — 7100000011 HC PHASE II RECOVERY - ADDTL 15 MIN: Performed by: STUDENT IN AN ORGANIZED HEALTH CARE EDUCATION/TRAINING PROGRAM

## 2025-02-21 PROCEDURE — 3609010700 HC COLONOSCOPY POLYPECTOMY REMOVAL SNARE/STOMA: Performed by: STUDENT IN AN ORGANIZED HEALTH CARE EDUCATION/TRAINING PROGRAM

## 2025-02-21 PROCEDURE — 88305 TISSUE EXAM BY PATHOLOGIST: CPT

## 2025-02-21 PROCEDURE — 82962 GLUCOSE BLOOD TEST: CPT

## 2025-02-21 PROCEDURE — 7100000010 HC PHASE II RECOVERY - FIRST 15 MIN: Performed by: STUDENT IN AN ORGANIZED HEALTH CARE EDUCATION/TRAINING PROGRAM

## 2025-02-21 PROCEDURE — 3700000000 HC ANESTHESIA ATTENDED CARE: Performed by: STUDENT IN AN ORGANIZED HEALTH CARE EDUCATION/TRAINING PROGRAM

## 2025-02-21 PROCEDURE — 6360000002 HC RX W HCPCS: Performed by: STUDENT IN AN ORGANIZED HEALTH CARE EDUCATION/TRAINING PROGRAM

## 2025-02-21 PROCEDURE — 2580000003 HC RX 258: Performed by: STUDENT IN AN ORGANIZED HEALTH CARE EDUCATION/TRAINING PROGRAM

## 2025-02-21 PROCEDURE — 3700000001 HC ADD 15 MINUTES (ANESTHESIA): Performed by: STUDENT IN AN ORGANIZED HEALTH CARE EDUCATION/TRAINING PROGRAM

## 2025-02-21 PROCEDURE — 45385 COLONOSCOPY W/LESION REMOVAL: CPT | Performed by: STUDENT IN AN ORGANIZED HEALTH CARE EDUCATION/TRAINING PROGRAM

## 2025-02-21 PROCEDURE — 2709999900 HC NON-CHARGEABLE SUPPLY: Performed by: STUDENT IN AN ORGANIZED HEALTH CARE EDUCATION/TRAINING PROGRAM

## 2025-02-21 RX ORDER — SODIUM CHLORIDE 9 MG/ML
INJECTION, SOLUTION INTRAVENOUS PRN
Status: DISCONTINUED | OUTPATIENT
Start: 2025-02-21 | End: 2025-02-21 | Stop reason: HOSPADM

## 2025-02-21 RX ORDER — LIDOCAINE HYDROCHLORIDE 10 MG/ML
1 INJECTION, SOLUTION INFILTRATION; PERINEURAL
Status: DISCONTINUED | OUTPATIENT
Start: 2025-02-21 | End: 2025-02-21 | Stop reason: HOSPADM

## 2025-02-21 RX ORDER — SODIUM CHLORIDE 0.9 % (FLUSH) 0.9 %
5-40 SYRINGE (ML) INJECTION PRN
Status: DISCONTINUED | OUTPATIENT
Start: 2025-02-21 | End: 2025-02-21 | Stop reason: HOSPADM

## 2025-02-21 RX ORDER — PROPOFOL 10 MG/ML
INJECTION, EMULSION INTRAVENOUS
Status: DISCONTINUED | OUTPATIENT
Start: 2025-02-21 | End: 2025-02-21 | Stop reason: SDUPTHER

## 2025-02-21 RX ORDER — SODIUM CHLORIDE 0.9 % (FLUSH) 0.9 %
5-40 SYRINGE (ML) INJECTION EVERY 12 HOURS SCHEDULED
Status: DISCONTINUED | OUTPATIENT
Start: 2025-02-21 | End: 2025-02-21 | Stop reason: HOSPADM

## 2025-02-21 RX ORDER — LIDOCAINE HYDROCHLORIDE 20 MG/ML
INJECTION, SOLUTION EPIDURAL; INFILTRATION; INTRACAUDAL; PERINEURAL
Status: DISCONTINUED | OUTPATIENT
Start: 2025-02-21 | End: 2025-02-21 | Stop reason: SDUPTHER

## 2025-02-21 RX ORDER — SODIUM CHLORIDE 9 MG/ML
25 INJECTION, SOLUTION INTRAVENOUS PRN
Status: DISCONTINUED | OUTPATIENT
Start: 2025-02-21 | End: 2025-02-21 | Stop reason: HOSPADM

## 2025-02-21 RX ORDER — SODIUM CHLORIDE, SODIUM LACTATE, POTASSIUM CHLORIDE, CALCIUM CHLORIDE 600; 310; 30; 20 MG/100ML; MG/100ML; MG/100ML; MG/100ML
INJECTION, SOLUTION INTRAVENOUS
Status: DISCONTINUED | OUTPATIENT
Start: 2025-02-21 | End: 2025-02-21 | Stop reason: SDUPTHER

## 2025-02-21 RX ORDER — SODIUM CHLORIDE, SODIUM LACTATE, POTASSIUM CHLORIDE, CALCIUM CHLORIDE 600; 310; 30; 20 MG/100ML; MG/100ML; MG/100ML; MG/100ML
INJECTION, SOLUTION INTRAVENOUS CONTINUOUS
Status: DISCONTINUED | OUTPATIENT
Start: 2025-02-21 | End: 2025-02-21 | Stop reason: HOSPADM

## 2025-02-21 RX ADMIN — PROPOFOL 80 MG: 10 INJECTION, EMULSION INTRAVENOUS at 07:40

## 2025-02-21 RX ADMIN — PROPOFOL 70 MG: 10 INJECTION, EMULSION INTRAVENOUS at 07:38

## 2025-02-21 RX ADMIN — PROPOFOL 50 MG: 10 INJECTION, EMULSION INTRAVENOUS at 07:52

## 2025-02-21 RX ADMIN — PROPOFOL 50 MG: 10 INJECTION, EMULSION INTRAVENOUS at 07:47

## 2025-02-21 RX ADMIN — PROPOFOL 50 MG: 10 INJECTION, EMULSION INTRAVENOUS at 07:50

## 2025-02-21 RX ADMIN — PROPOFOL 50 MG: 10 INJECTION, EMULSION INTRAVENOUS at 07:56

## 2025-02-21 RX ADMIN — SODIUM CHLORIDE, SODIUM LACTATE, POTASSIUM CHLORIDE, AND CALCIUM CHLORIDE: 600; 310; 30; 20 INJECTION, SOLUTION INTRAVENOUS at 07:34

## 2025-02-21 RX ADMIN — PROPOFOL 50 MG: 10 INJECTION, EMULSION INTRAVENOUS at 07:43

## 2025-02-21 RX ADMIN — PROPOFOL 50 MG: 10 INJECTION, EMULSION INTRAVENOUS at 07:45

## 2025-02-21 RX ADMIN — LIDOCAINE HYDROCHLORIDE 80 MG: 20 INJECTION, SOLUTION EPIDURAL; INFILTRATION; INTRACAUDAL; PERINEURAL at 07:38

## 2025-02-21 RX ADMIN — PROPOFOL 50 MG: 10 INJECTION, EMULSION INTRAVENOUS at 07:54

## 2025-02-21 ASSESSMENT — PAIN - FUNCTIONAL ASSESSMENT
PAIN_FUNCTIONAL_ASSESSMENT: NONE - DENIES PAIN
PAIN_FUNCTIONAL_ASSESSMENT: NONE - DENIES PAIN
PAIN_FUNCTIONAL_ASSESSMENT: 0-10
PAIN_FUNCTIONAL_ASSESSMENT: NONE - DENIES PAIN

## 2025-02-21 NOTE — ANESTHESIA PRE PROCEDURE
Department of Anesthesiology  Preprocedure Note       Name:  Hamida Dolan   Age:  49 y.o.  :  1975                                          MRN:  [id number]         Date:  2025      Surgeon: Surgeon(s):  Luis Griffin MD    Procedure: Procedure(s):  COLORECTAL CANCER SCREENING, NOT HIGH RISK    Medications prior to admission:   Prior to Admission medications    Medication Sig Start Date End Date Taking? Authorizing Provider   cyclobenzaprine (FLEXERIL) 10 MG tablet Take 1 tablet by mouth 2 times daily as needed for Muscle spasms 25  Yes Kelly Smith DO   montelukast (SINGULAIR) 10 MG tablet Take 1 tablet by mouth daily 25  Yes Kelly Smith DO   ondansetron (ZOFRAN-ODT) 4 MG disintegrating tablet Take 1 tablet by mouth every 12 hours as needed for Nausea or Vomiting 25  Yes Kelly Smith DO   SUMAtriptan (IMITREX) 50 MG tablet Take as needed for Migraines (repeat after 2 hours as needed). Max daily dose of 100 mg. 25  Yes Kelly Smith DO   losartan (COZAAR) 25 MG tablet Take 0.5 tablets by mouth daily 25  Yes Kelly Smith DO   metFORMIN (GLUCOPHAGE-XR) 500 MG extended release tablet Take 2 tablets by mouth in the morning and at bedtime 25  Yes Kelly Smith DO   atorvastatin (LIPITOR) 20 MG tablet Take 1 tablet by mouth daily  Patient taking differently: Take 1 tablet by mouth every morning 25  Yes Kelly Smith DO   pantoprazole (PROTONIX) 40 MG tablet Take 1 tablet by mouth every morning (before breakfast) 25  Yes Kelly Smith DO   FLUoxetine (PROZAC) 10 MG capsule Take 3 capsules by mouth daily 25  Yes Kelly Smith DO   fenofibrate 160 MG tablet Take 1 tablet by mouth daily 25  Yes Kelly Smith DO   Icosapent Ethyl (VASCEPA) 1 g CAPS capsule Take 2 capsules by mouth 2 times daily 25 Yes Kelly Smith, DO   Multiple Vitamin

## 2025-02-21 NOTE — ANESTHESIA POSTPROCEDURE EVALUATION
Department of Anesthesiology  Postprocedure Note    Patient: Hamida Dolan  MRN: [id number]  YOB: 1975  Date of evaluation: 2/21/2025    Procedure Summary       Date: 02/21/25 Room / Location: [facility] 04 / [facility] ENDOSCOPY    Anesthesia Start: 0734 Anesthesia Stop: 0807    Procedure: COLONOSCOPY POLYPECTOMY/BIOPSY (Lower GI Region) Diagnosis:       Encounter for screening colonoscopy      (Encounter for screening colonoscopy [Z12.11])    Surgeons: Luis Griffin MD Responsible Provider: Aditya Carney IV, MD    Anesthesia Type: TIVA ASA Status: 3            Anesthesia Type: No value filed.    Padmini Phase I:      Padmini Phase II: Padmini Score: 10    Anesthesia Post Evaluation    Patient location during evaluation: PACU  Patient participation: complete - patient participated  Level of consciousness: awake and alert  Airway patency: patent  Nausea & Vomiting: no nausea and no vomiting  Cardiovascular status: hemodynamically stable  Respiratory status: acceptable, nonlabored ventilation and spontaneous ventilation  Hydration status: euvolemic  Comments: /81   Pulse 83   Temp 98.2 °F (36.8 °C) (Skin)   Resp 18   Ht 1.715 m (5' 7.5\")   Wt 117.9 kg (260 lb)   LMP 01/24/2025 (Exact Date) Comment: pregnancy refusal form signed  SpO2 98%   BMI 40.12 kg/m²     Multimodal analgesia pain management approach  Pain management: adequate and satisfactory to patient    No notable events documented.

## 2025-02-21 NOTE — TELEPHONE ENCOUNTER
Called pt and left voicemail advising them to return call to schedule follow up office visit with PA, Melissa Grinnell per Dr. Griffin's procedure note.

## 2025-02-21 NOTE — H&P
Hamida Dolan (:  1975) is a 48 y.o. female new patient referred to our office for evaluation of the following chief complaint(s):  New Patient              Assessment & Plan  ASSESSMENT/PLAN:  1. Gastroesophageal reflux disease, unspecified whether esophagitis present  2. Chronic diarrhea  3. Family hx of colon cancer requiring screening colonoscopy  4. Obesity, Class III, BMI 40-49.9 (morbid obesity) (HCC)        -Counseled patient and provided written recommendations for diet and lifestyle modifications for GERD. Avoid tobacco, alcohol, NSAIDs. Currently taking Protonix 40 mg daily and starting to see some improvement. Has 8-10 year hx of GERD with no prior EGD. Will schedule.  -Recent diagnosis of colon cancer in her sister. Will schedule first surveillance colonoscopy.  -Hx chronic diarrhea which started with Metformin use.            Subjective  SUBJECTIVE/OBJECTIVE  Hamida Dolan is a 48 y.o. year old female with PMH pertinent for HPL, NIDDM, GERD, obesity with BMI 41.03, anxiety, migraines. Surgical history is pertinent for , LEEP.      Patient was referred to our office for evaluation of GERD and colon cancer screening. Referral note reviewed from 2024. No prior GI or endoscopic evaluation discovered on chart review.     Today patient reports hx of chronic diarrhea which she attributes to Metformin use. Typically has 3-4 stools daily, rarely solid. Sometimes has urgency and fecal incontinence. This improved slightly with transition to XR formula. Denies melena, hematochezia, mucus in the stool. No prior cholecystectomy, pancreatitis. She reports chronic acid reflux x 8-10 years and was recently started on Protonix a month ago and has had some relief. Her dentist noticed dental erosions and was concerned for reflux. Denies nocturnal reflux or diarrhea. Denies nausea, vomiting except with occasional migraine headaches. Denies dysphagia or odynophagia.      Denies prior EGD. She  by mouth daily 90 capsule 1    ondansetron (ZOFRAN-ODT) 4 MG disintegrating tablet Place 1 tablet under the tongue every 12 hours as needed for Nausea or Vomiting 21 tablet 1    pantoprazole (PROTONIX) 40 MG tablet Take 1 tablet by mouth every morning (before breakfast) 90 tablet 1    Paragard Intrauterine Copper IUD 1 each by IntraUTERine route once        Multiple Vitamin (MULTIVITAMIN ADULT PO) Take 1 tablet by mouth daily        fexofenadine (ALLEGRA) 180 MG tablet Take 1 tablet by mouth daily as needed        SUMAtriptan (IMITREX) 50 MG tablet Take 1 tablet by mouth once as needed for Migraine (repeat after 2 hours as needed) Max daily dose of 100 mg 9 tablet 5      No current facility-administered medications for this visit.            Review of Systems  All other systems reviewed and are negative except as noted above.                  Objective      Vitals:     05/21/24 1519   BP: 129/77   Pulse: 82   Resp: 17   SpO2: 97%         Physical Exam  Vitals reviewed.   Constitutional:       General: She is not in acute distress.     Appearance: She is obese. She is not ill-appearing, toxic-appearing or diaphoretic.   Eyes:      General: No scleral icterus.  Cardiovascular:      Rate and Rhythm: Normal rate and regular rhythm.      Heart sounds: No murmur heard.  Pulmonary:      Effort: Pulmonary effort is normal. No respiratory distress.      Breath sounds: Normal breath sounds. No wheezing, rhonchi or rales.   Abdominal:      General: There is no distension.      Palpations: Abdomen is soft.      Tenderness: There is no abdominal tenderness. There is no guarding or rebound.   Skin:     General: Skin is warm and dry.      Coloration: Skin is not jaundiced.   Neurological:      General: No focal deficit present.      Mental Status: She is alert and oriented to person, place, and time.   Psychiatric:         Mood and Affect: Mood normal.         Behavior: Behavior normal.         Thought Content: Thought content

## 2025-02-21 NOTE — DISCHARGE INSTRUCTIONS
Gastrointestinal Colonoscopy/Flexible Sigmoidoscopy - Lower Exam Discharge Instructions  Call Dr. Griffin at [phone] for any problems or questions.  Contact the doctor’s office for follow up appointment as directed  Medication may cause drowsiness for several hours, therefore, do not drive or operate machinery for remainder of the day.  No alcohol today.  Ordinarily, you may resume regular diet and activity after exam unless otherwise specified by your physician.  Because of air put into your colon during exam, you may experience some abdominal distension and nausea, relieved by the passage of gas, for several hours.  Contact your physician if you have any of the following:  Excessive amount of bleeding - large amount when having a bowel movement.  Occasional specks of blood with bowel movement would not be unusual.  Severe abdominal pain  Fever or Chills  Polyp Removal - follow these additional instructions  No Aspirin, Advil, Aleve, Nuprin, Ibuprofen, or medications that contain these drugs for 2 weeks.    Any additional instructions:      Check myChart for pathology report in 1-2 weeks  Repeat colonoscopy in 3 years.   We will arrange you a follow up visit with Maggy in 3-4 weeks (call [phone] if you don't hear from the office by Wednesday, 2/26).

## 2025-03-11 ENCOUNTER — RESULTS FOLLOW-UP (OUTPATIENT)
Dept: ENDOSCOPY | Age: 50
End: 2025-03-11

## 2025-03-19 PROBLEM — Z12.11 ENCOUNTER FOR SCREENING COLONOSCOPY: Status: RESOLVED | Noted: 2025-02-17 | Resolved: 2025-03-19

## 2025-05-20 ENCOUNTER — TELEPHONE (OUTPATIENT)
Dept: FAMILY MEDICINE CLINIC | Facility: CLINIC | Age: 50
End: 2025-05-20

## 2025-05-20 ENCOUNTER — LAB (OUTPATIENT)
Dept: FAMILY MEDICINE CLINIC | Facility: CLINIC | Age: 50
End: 2025-05-20

## 2025-05-20 DIAGNOSIS — F41.1 GENERALIZED ANXIETY DISORDER: ICD-10-CM

## 2025-05-20 DIAGNOSIS — E11.9 CONTROLLED TYPE 2 DIABETES MELLITUS WITHOUT COMPLICATION, WITHOUT LONG-TERM CURRENT USE OF INSULIN (HCC): ICD-10-CM

## 2025-05-20 DIAGNOSIS — G43.709 CHRONIC MIGRAINE W/O AURA W/O STATUS MIGRAINOSUS, NOT INTRACTABLE: ICD-10-CM

## 2025-05-20 DIAGNOSIS — K21.9 GASTROESOPHAGEAL REFLUX DISEASE, UNSPECIFIED WHETHER ESOPHAGITIS PRESENT: ICD-10-CM

## 2025-05-20 DIAGNOSIS — E78.2 MIXED HYPERLIPIDEMIA: ICD-10-CM

## 2025-05-20 LAB
ALBUMIN SERPL-MCNC: 3.7 G/DL (ref 3.5–5)
ALBUMIN/GLOB SERPL: 1 (ref 1–1.9)
ALP SERPL-CCNC: 65 U/L (ref 35–104)
ALT SERPL-CCNC: 113 U/L (ref 8–45)
ANION GAP SERPL CALC-SCNC: 11 MMOL/L (ref 7–16)
AST SERPL-CCNC: 113 U/L (ref 15–37)
BASOPHILS # BLD: 0.03 K/UL (ref 0–0.2)
BASOPHILS NFR BLD: 0.5 % (ref 0–2)
BILIRUB SERPL-MCNC: 0.3 MG/DL (ref 0–1.2)
BUN SERPL-MCNC: 16 MG/DL (ref 6–23)
CALCIUM SERPL-MCNC: 9.2 MG/DL (ref 8.8–10.2)
CHLORIDE SERPL-SCNC: 101 MMOL/L (ref 98–107)
CHOLEST SERPL-MCNC: 137 MG/DL (ref 0–200)
CO2 SERPL-SCNC: 24 MMOL/L (ref 20–29)
CREAT SERPL-MCNC: 0.66 MG/DL (ref 0.6–1.1)
DIFFERENTIAL METHOD BLD: ABNORMAL
EOSINOPHIL # BLD: 0.11 K/UL (ref 0–0.8)
EOSINOPHIL NFR BLD: 2 % (ref 0.5–7.8)
ERYTHROCYTE [DISTWIDTH] IN BLOOD BY AUTOMATED COUNT: 16.4 % (ref 11.9–14.6)
EST. AVERAGE GLUCOSE BLD GHB EST-MCNC: 193 MG/DL
GLOBULIN SER CALC-MCNC: 3.6 G/DL (ref 2.3–3.5)
GLUCOSE SERPL-MCNC: 188 MG/DL (ref 70–99)
HBA1C MFR BLD: 8.4 % (ref 0–5.6)
HCT VFR BLD AUTO: 38.6 % (ref 35.8–46.3)
HDLC SERPL-MCNC: 28 MG/DL (ref 40–60)
HDLC SERPL: 4.8 (ref 0–5)
HGB BLD-MCNC: 11.8 G/DL (ref 11.7–15.4)
IMM GRANULOCYTES # BLD AUTO: 0.03 K/UL (ref 0–0.5)
IMM GRANULOCYTES NFR BLD AUTO: 0.5 % (ref 0–5)
LDLC SERPL CALC-MCNC: 39 MG/DL (ref 0–100)
LYMPHOCYTES # BLD: 2.1 K/UL (ref 0.5–4.6)
LYMPHOCYTES NFR BLD: 38 % (ref 13–44)
MCH RBC QN AUTO: 21.5 PG (ref 26.1–32.9)
MCHC RBC AUTO-ENTMCNC: 30.6 G/DL (ref 31.4–35)
MCV RBC AUTO: 70.4 FL (ref 82–102)
MONOCYTES # BLD: 0.43 K/UL (ref 0.1–1.3)
MONOCYTES NFR BLD: 7.8 % (ref 4–12)
NEUTS SEG # BLD: 2.82 K/UL (ref 1.7–8.2)
NEUTS SEG NFR BLD: 51.2 % (ref 43–78)
NRBC # BLD: 0 K/UL (ref 0–0.2)
PLATELET # BLD AUTO: 299 K/UL (ref 150–450)
PMV BLD AUTO: 10.4 FL (ref 9.4–12.3)
POTASSIUM SERPL-SCNC: 4.1 MMOL/L (ref 3.5–5.1)
PROT SERPL-MCNC: 7.3 G/DL (ref 6.3–8.2)
RBC # BLD AUTO: 5.48 M/UL (ref 4.05–5.2)
SODIUM SERPL-SCNC: 136 MMOL/L (ref 136–145)
TRIGL SERPL-MCNC: 347 MG/DL (ref 0–150)
VLDLC SERPL CALC-MCNC: 69 MG/DL (ref 6–23)
WBC # BLD AUTO: 5.5 K/UL (ref 4.3–11.1)

## 2025-05-20 NOTE — TELEPHONE ENCOUNTER
Patient came into office 05/20/2025 to get blood work done, patient had to reschedule 4mo f/u due to being out of town.      Patient stated that her medicaion JARDIANCE 25 MG tablet is causing her yeast infections, patient stated that she was going to mentiont his during her appointment but with it being rescheduled to July she cannot wait that long.    -Please Advise

## 2025-05-21 ENCOUNTER — PATIENT MESSAGE (OUTPATIENT)
Dept: INTERNAL MEDICINE CLINIC | Facility: CLINIC | Age: 50
End: 2025-05-21

## 2025-05-21 NOTE — TELEPHONE ENCOUNTER
Returned call to pt, no answer.     Please advise, should we justino a virtual to address concern?

## 2025-07-09 ENCOUNTER — OFFICE VISIT (OUTPATIENT)
Dept: INTERNAL MEDICINE CLINIC | Facility: CLINIC | Age: 50
End: 2025-07-09
Payer: COMMERCIAL

## 2025-07-09 VITALS
DIASTOLIC BLOOD PRESSURE: 83 MMHG | BODY MASS INDEX: 38.95 KG/M2 | TEMPERATURE: 98.1 F | HEART RATE: 86 BPM | HEIGHT: 68 IN | WEIGHT: 257 LBS | OXYGEN SATURATION: 98 % | SYSTOLIC BLOOD PRESSURE: 124 MMHG

## 2025-07-09 DIAGNOSIS — B37.31 CANDIDA VAGINITIS: ICD-10-CM

## 2025-07-09 DIAGNOSIS — B07.9 VIRAL WART ON FINGER: ICD-10-CM

## 2025-07-09 DIAGNOSIS — F41.1 GENERALIZED ANXIETY DISORDER: ICD-10-CM

## 2025-07-09 DIAGNOSIS — E11.65 TYPE 2 DIABETES MELLITUS WITH HYPERGLYCEMIA, WITHOUT LONG-TERM CURRENT USE OF INSULIN (HCC): Primary | ICD-10-CM

## 2025-07-09 DIAGNOSIS — K21.9 GASTROESOPHAGEAL REFLUX DISEASE, UNSPECIFIED WHETHER ESOPHAGITIS PRESENT: ICD-10-CM

## 2025-07-09 DIAGNOSIS — E66.9 OBESITY (BMI 30-39.9): ICD-10-CM

## 2025-07-09 DIAGNOSIS — E78.2 MIXED HYPERLIPIDEMIA: ICD-10-CM

## 2025-07-09 DIAGNOSIS — J30.9 CHRONIC ALLERGIC RHINITIS: ICD-10-CM

## 2025-07-09 PROCEDURE — 3052F HG A1C>EQUAL 8.0%<EQUAL 9.0%: CPT | Performed by: FAMILY MEDICINE

## 2025-07-09 PROCEDURE — 99214 OFFICE O/P EST MOD 30 MIN: CPT | Performed by: FAMILY MEDICINE

## 2025-07-09 RX ORDER — COPPER 313.4 MG/1
1 INTRAUTERINE DEVICE INTRAUTERINE ONCE
COMMUNITY

## 2025-07-09 RX ORDER — LOSARTAN POTASSIUM 25 MG/1
12.5 TABLET ORAL DAILY
Qty: 90 TABLET | Refills: 1 | Status: SHIPPED | OUTPATIENT
Start: 2025-07-09

## 2025-07-09 RX ORDER — MONTELUKAST SODIUM 10 MG/1
10 TABLET ORAL DAILY
Qty: 90 TABLET | Refills: 1 | Status: SHIPPED | OUTPATIENT
Start: 2025-07-09

## 2025-07-09 RX ORDER — ICOSAPENT ETHYL 1 G/1
2 CAPSULE ORAL 2 TIMES DAILY
COMMUNITY

## 2025-07-09 RX ORDER — FENOFIBRATE 160 MG/1
160 TABLET ORAL DAILY
Qty: 90 TABLET | Refills: 1 | Status: SHIPPED | OUTPATIENT
Start: 2025-07-09

## 2025-07-09 RX ORDER — PANTOPRAZOLE SODIUM 40 MG/1
40 TABLET, DELAYED RELEASE ORAL
Qty: 90 TABLET | Refills: 1 | Status: SHIPPED | OUTPATIENT
Start: 2025-07-09

## 2025-07-09 RX ORDER — FLUCONAZOLE 150 MG/1
150 TABLET ORAL
Qty: 3 TABLET | Refills: 0 | Status: SHIPPED | OUTPATIENT
Start: 2025-07-09 | End: 2025-07-18

## 2025-07-09 RX ORDER — FLUOXETINE 10 MG/1
30 CAPSULE ORAL DAILY
Qty: 270 CAPSULE | Refills: 1 | Status: SHIPPED | OUTPATIENT
Start: 2025-07-09

## 2025-07-09 RX ORDER — FLUCONAZOLE 150 MG/1
150 TABLET ORAL
Qty: 3 TABLET | Refills: 0 | Status: SHIPPED | OUTPATIENT
Start: 2025-07-09 | End: 2025-07-09

## 2025-07-09 RX ORDER — METFORMIN HYDROCHLORIDE 500 MG/1
1000 TABLET, EXTENDED RELEASE ORAL 2 TIMES DAILY
Qty: 360 TABLET | Refills: 1 | Status: SHIPPED | OUTPATIENT
Start: 2025-07-09

## 2025-07-09 ASSESSMENT — PATIENT HEALTH QUESTIONNAIRE - PHQ9
1. LITTLE INTEREST OR PLEASURE IN DOING THINGS: NOT AT ALL
2. FEELING DOWN, DEPRESSED OR HOPELESS: NOT AT ALL
SUM OF ALL RESPONSES TO PHQ QUESTIONS 1-9: 0

## 2025-07-09 NOTE — PROGRESS NOTES
Kelly Smith DO  [facility] [place] [address]  Phone [phone]  Fax [phone]      Hamida Dolan (: 1975) is a 49 y.o. female, here for evaluation of the following chief complaint(s):  Follow-up, Discuss Labs, Medication Refill, Medication Check (Jardiance ), and Vaginal Discharge (Mild vaginal discharge, itchiness and pain )       ASSESSMENT/PLAN:  1. Type 2 diabetes mellitus with hyperglycemia, without long-term current use of insulin (HCC)  Overview:  >>OVERVIEW FOR CONTROLLED TYPE 2 DIABETES MELLITUS WITHOUT COMPLICATION, WITHOUT LONG-TERM CURRENT USE OF INSULIN (HCC) WRITTEN ON 2025  9:21 AM BY KELLY SMITH DO    Tolerating medication well and achieving desired therapeutic response.  Jardiance gave recurrent vaginal yeast infections.  Will continue with:   Diabetic Medications       Sulfonylureas       glipiZIDE (GLUCOTROL) 5 MG tablet Take 1 tablet by mouth 2 times daily (before meals)       Biguanides       metFORMIN (GLUCOPHAGE-XR) 500 MG extended release tablet Take 2 tablets by mouth in the morning and at bedtime       Incretin Mimetic Agents       Tirzepatide (MOUNJARO) 2.5 MG/0.5ML SOAJ pen Inject 2.5 mg into the skin every 7 days     Tirzepatide (MOUNJARO) 5 MG/0.5ML SOAJ pen Inject 1 pen  into the skin every 7 days Start after completing the 2.5 mg dose        .  A1c levels reviewed--will recheck. Encourage routine foot care. Recommend routine eye exams.  Encourage diet and exercise and medication adherence.   Placed her on Mounjaro, unknown coverage and PA requirement discussed with patient, Reviewed side effects, interactions, and safety precautions.     Orders:  -     Tirzepatide (MOUNJARO) 2.5 MG/0.5ML SOAJ pen; Inject 2.5 mg into the skin every 7 days, Disp-2 mL, R-0Normal  -     Tirzepatide (MOUNJARO) 5 MG/0.5ML SOAJ pen; Inject 1 pen  into the skin every 7 days Start after completing the 2.5 mg

## 2025-07-10 ENCOUNTER — APPOINTMENT (OUTPATIENT)
Dept: URBAN - METROPOLITAN AREA CLINIC 329 | Facility: CLINIC | Age: 50
Setting detail: DERMATOLOGY
End: 2025-07-10

## 2025-07-10 DIAGNOSIS — L57.8 OTHER SKIN CHANGES DUE TO CHRONIC EXPOSURE TO NONIONIZING RADIATION: ICD-10-CM | Status: INADEQUATELY CONTROLLED

## 2025-07-10 DIAGNOSIS — B07.8 OTHER VIRAL WARTS: ICD-10-CM

## 2025-07-10 PROCEDURE — ? LIQUID NITROGEN

## 2025-07-10 PROCEDURE — ? FULL BODY SKIN EXAM - DECLINED

## 2025-07-10 PROCEDURE — ? COUNSELING

## 2025-07-10 ASSESSMENT — LOCATION DETAILED DESCRIPTION DERM
LOCATION DETAILED: RIGHT INFERIOR CENTRAL MALAR CHEEK
LOCATION DETAILED: RIGHT MID RADIAL DORSAL INDEX FINGER
LOCATION DETAILED: LEFT INFERIOR MEDIAL MALAR CHEEK

## 2025-07-10 ASSESSMENT — LOCATION ZONE DERM
LOCATION ZONE: FACE
LOCATION ZONE: FINGER

## 2025-07-10 ASSESSMENT — LOCATION SIMPLE DESCRIPTION DERM
LOCATION SIMPLE: RIGHT CHEEK
LOCATION SIMPLE: RIGHT INDEX FINGER
LOCATION SIMPLE: LEFT CHEEK

## 2025-07-10 NOTE — PROCEDURE: LIQUID NITROGEN
Medical Necessity Information: It is in your best interest to select a reason for this procedure from the list below. All of these items fulfill various CMS LCD requirements except the new and changing color options.
Spray Paint Technique: No
Consent: The patient's consent was obtained including but not limited to risks of crusting, scabbing, blistering, scarring, darker or lighter pigmentary change, recurrence, incomplete removal and infection.
Spray Paint Text: The liquid nitrogen was applied to the skin utilizing a spray paint frosting technique.
Medical Necessity Clause: This procedure was medically necessary because the lesions that were treated were:
Post-Care Instructions: I reviewed with the patient in detail post-care instructions. Patient is to wear sunprotection, and avoid picking at any of the treated lesions. Pt may apply Vaseline to crusted or scabbing areas.
Show Topical Anesthesia Variable?: Yes
Detail Level: Detailed

## 2025-07-10 NOTE — HPI: EVALUATION OF SKIN LESION(S)
What Type Of Note Output Would You Prefer (Optional)?: Bullet Format
Hpi Title: Evaluation of a Skin Lesion
How Severe Are Your Spot(S)?: mild
Have Your Spot(S) Been Treated In The Past?: has not been treated
Additional History: Patient presents with a wart on her right index finger.

## 2025-07-14 PROBLEM — B37.31 CANDIDA VAGINITIS: Status: ACTIVE | Noted: 2025-07-14

## 2025-07-14 PROBLEM — E11.65 TYPE 2 DIABETES MELLITUS WITH HYPERGLYCEMIA, WITHOUT LONG-TERM CURRENT USE OF INSULIN (HCC): Status: ACTIVE | Noted: 2022-05-05

## (undated) DEVICE — AIRLIFE™ OXYGEN TUBING 7 FEET (2.1 M) CRUSH RESISTANT OXYGEN TUBING, VINYL TIPPED: Brand: AIRLIFE™

## (undated) DEVICE — CONTAINER FORMALIN PREFILLED 10% NBF 60ML

## (undated) DEVICE — ENDOSCOPIC KIT 1.1+ OP4 CA DE 2 GWN AAMI LEVEL 3

## (undated) DEVICE — NEEDLE SYRINGE 18GA L1.5IN RED PLAS HUB S STL BLNT FILL W/O

## (undated) DEVICE — SINGLE PORT MANIFOLD: Brand: NEPTUNE 2

## (undated) DEVICE — CONNECTOR TBNG OD5-7MM O2 END DISP

## (undated) DEVICE — DISPOSABLE BIOPSY VALVE MAJ-1555: Brand: SINGLE USE BIOPSY VALVE (STERILE)

## (undated) DEVICE — SYRINGE MED 10ML LUERLOCK TIP W/O SFTY DISP

## (undated) DEVICE — TRAP SPEC POLYP REM STRNR CLN DSGN MAGNIFYING WIND DISP

## (undated) DEVICE — KENDALL RADIOLUCENT FOAM MONITORING ELECTRODE RECTANGULAR SHAPE: Brand: KENDALL

## (undated) DEVICE — LUBE JELLY FOIL PACK 1.4 OZ: Brand: MEDLINE INDUSTRIES, INC.

## (undated) DEVICE — Device

## (undated) DEVICE — FORCEPS BX L240CM JAW DIA2.8MM L CAP W/ NDL MIC MESH TOOTH

## (undated) DEVICE — SYRINGE MEDICAL 3ML CLEAR PLASTIC STANDARD NON CONTROL LUERLOCK TIP DISPOSABLE

## (undated) DEVICE — GAUZE,SPONGE,4"X4",12PLY,WOVEN,NS,LF: Brand: MEDLINE